# Patient Record
Sex: FEMALE | Race: WHITE | NOT HISPANIC OR LATINO | Employment: FULL TIME | URBAN - NONMETROPOLITAN AREA
[De-identification: names, ages, dates, MRNs, and addresses within clinical notes are randomized per-mention and may not be internally consistent; named-entity substitution may affect disease eponyms.]

---

## 2022-02-12 ENCOUNTER — APPOINTMENT (EMERGENCY)
Dept: CT IMAGING | Facility: HOSPITAL | Age: 42
End: 2022-02-12

## 2022-02-12 ENCOUNTER — APPOINTMENT (EMERGENCY)
Dept: RADIOLOGY | Facility: HOSPITAL | Age: 42
End: 2022-02-12

## 2022-02-12 ENCOUNTER — HOSPITAL ENCOUNTER (EMERGENCY)
Facility: HOSPITAL | Age: 42
Discharge: HOME | End: 2022-02-12
Attending: EMERGENCY MEDICINE | Admitting: EMERGENCY MEDICINE

## 2022-02-12 VITALS
HEART RATE: 66 BPM | WEIGHT: 152.56 LBS | DIASTOLIC BLOOD PRESSURE: 77 MMHG | RESPIRATION RATE: 22 BRPM | OXYGEN SATURATION: 94 % | SYSTOLIC BLOOD PRESSURE: 133 MMHG | TEMPERATURE: 97.4 F

## 2022-02-12 DIAGNOSIS — S09.90XA HEAD INJURY: ICD-10-CM

## 2022-02-12 DIAGNOSIS — S01.91XA LACERATION OF HEAD: Primary | ICD-10-CM

## 2022-02-12 PROCEDURE — 99284 EMERGENCY DEPT VISIT MOD MDM: CPT

## 2022-02-12 PROCEDURE — 99282 EMERGENCY DEPT VISIT SF MDM: CPT | Performed by: EMERGENCY MEDICINE

## 2022-02-12 PROCEDURE — 70450 CT HEAD/BRAIN W/O DYE: CPT

## 2022-02-12 RX ORDER — LIDOCAINE HYDROCHLORIDE 10 MG/ML
10 INJECTION, SOLUTION EPIDURAL; INFILTRATION; INTRACAUDAL; PERINEURAL ONCE
Status: DISCONTINUED | OUTPATIENT
Start: 2022-02-12 | End: 2022-02-12 | Stop reason: HOSPADM
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
## 2022-02-13 NOTE — ED NOTES
Patient refusing to let staff assist to repair laceration to head  Stating multiple times "leave me the fuck alone, everyone needs to back the fuck off, I just want to sleep"  Asked if she wants us to repair the laceration or not and stated no  Provider explained the risks of not allowing us to repair laceration  Patient continues to be uncooperative  Given discharge instructions  Initially refusing to leave stating she just wanted to sleep  Advised that she is discharged and it is now time to go if she is not accepting our services, and if she does not leave security will be called and PD if necessary  Patient whipped open doors to room and stormed out of department, steady gait noted        Jerilyn Han, RN  02/12/22 2020

## 2022-02-13 NOTE — ED NOTES
AAO, talkative and Tearful during assessment, reassurance and moral support provided  Reports/expresses how vehicle is left at the police station, and do not know how she will "get my car" offered to insert IV line, and clean dry blood- pt refuses  No reports of acute pain or discomfort at present        Isis Alvarez RN  02/12/22 9070

## 2022-06-07 ENCOUNTER — HOSPITAL ENCOUNTER (EMERGENCY)
Facility: HOSPITAL | Age: 42
Discharge: HOME | End: 2022-06-07
Attending: EMERGENCY MEDICINE

## 2022-06-07 ENCOUNTER — APPOINTMENT (EMERGENCY)
Dept: RADIOLOGY | Facility: HOSPITAL | Age: 42
End: 2022-06-07

## 2022-06-07 VITALS
DIASTOLIC BLOOD PRESSURE: 91 MMHG | HEART RATE: 100 BPM | WEIGHT: 144.18 LBS | SYSTOLIC BLOOD PRESSURE: 146 MMHG | OXYGEN SATURATION: 98 % | RESPIRATION RATE: 18 BRPM | TEMPERATURE: 97.2 F

## 2022-06-07 DIAGNOSIS — S92.501A: Primary | ICD-10-CM

## 2022-06-07 PROCEDURE — 99283 EMERGENCY DEPT VISIT LOW MDM: CPT

## 2022-06-07 PROCEDURE — 99284 EMERGENCY DEPT VISIT MOD MDM: CPT | Performed by: EMERGENCY MEDICINE

## 2022-06-07 PROCEDURE — 73630 X-RAY EXAM OF FOOT: CPT

## 2022-06-07 RX ORDER — ACETAMINOPHEN 325 MG/1
975 TABLET ORAL ONCE
Status: DISCONTINUED | OUTPATIENT
Start: 2022-06-07 | End: 2022-06-07 | Stop reason: HOSPADM

## 2022-06-07 RX ORDER — KETOROLAC TROMETHAMINE 30 MG/ML
30 INJECTION, SOLUTION INTRAMUSCULAR; INTRAVENOUS ONCE
Status: DISCONTINUED | OUTPATIENT
Start: 2022-06-07 | End: 2022-06-07 | Stop reason: HOSPADM

## 2022-06-07 RX ORDER — OXYCODONE HYDROCHLORIDE 5 MG/1
5 TABLET ORAL EVERY 6 HOURS PRN
Qty: 5 TABLET | Refills: 0 | Status: SHIPPED | OUTPATIENT
Start: 2022-06-07 | End: 2022-06-10

## 2022-06-07 NOTE — Clinical Note
Nehemiahrosalva Lee was seen and treated in our emergency department on 6/7/2022  Diagnosis:     Crystal       She may return on this date: If you have any questions or concerns, please don't hesitate to call        Alex Chandra RN    ______________________________           _______________          _______________  Hospital Representative                              Date                                Time

## 2022-06-07 NOTE — ED PROVIDER NOTES
Final Diagnosis:  1  Closed fracture of fifth toe of right foot        Chief Complaint   Patient presents with   [address] [facility] Injury     Patient reports fall last week twisting right ankle  Since has had pain in right foot  Today hit bed with same foot making pain worse  HPI  Patient presents for evaluation of right-sided foot pain  Patient states that approximately a week ago she got her foot stuck between 2 objects in there was a twisting action  She states that she heard a lot of popping and snapping at that time and had bruising discoloration over the lateral aspect of her right distal foot  She said then the pain generally improved but today she then struck the seen area against of bed and had the acute onset of pain once again  At this time she identifies this as being mostly focus over her 4th and 5th distal metatarsals  Denies any other injury  This worse with palpation or pressure  Better at rest       Unless otherwise specified:  - No language barrier    - History obtained from patient  - There are no limitations to the history obtained  - Previous charting was reviewed    PMH:   has a past medical history of Alcohol abuse, Anxiety, and Hypertension  PSH:   has no past surgical history on file  ROS:  Review of Systems   -   - 13 point ROS was performed and all are normal unless stated in the history above  - Nursing note reviewed  Vitals reviewed  - Orders placed by myself and/or advanced practitioner / resident  PE:   Vitals:    06/07/22 0804 06/07/22 0805 06/07/22 0815   BP:  (!) 152/102 146/91   BP Location:  Right arm Right arm   Pulse:  105 100   Resp:  18 18   Temp: (!) 97 2 °F (36 2 °C)     TempSrc: Temporal     SpO2:  99% 98%   Weight:  65 4 kg (144 lb 2 9 oz)      Vitals reviewed by me  Patient has mild erythema and generalized swelling over 5th digit on right foot    She is tender to palpation over the distal portion of her 4th and 5th metatarsals as well as her 5th phalanges  Good capillary refill  Full range of motion  Sensation intact  No tenderness of the ankle  Achilles intact  Unless otherwise specified above:    General: VS reviewed  Appears in NAD    Head: Normocephalic, atraumatic  Eyes: EOM-I  No exudate  ENT: Atraumatic external nose and ears  No malocclusion  No stridor  No drooling  Neck: No JVD  CV: No pallor noted  Lungs:   No tachypnea  No respiratory distress    Abdomen:  Soft, non-tender, non-distended    MSK:   FROM spontaneously  No obvious deformity    Skin: Dry, intact  No obvious rash  Neuro: Awake, alert, GCS15, CN II-XII grossly intact  Speaking in full sentences  Motor grossly intact  Psychiatric/Behavioral: Appropriate mood and affect   Exam: deferred    Physical Exam     Procedures   A:  - Nursing note reviewed  XR foot 3+ views RIGHT   ED Interpretation   Fracture of 5th proximal phalanges        Orders Placed This Encounter   Procedures    XR foot 3+ views RIGHT     Labs Reviewed - No data to display      Final Diagnosis:  1  Closed fracture of fifth toe of right foot        P:  - patient presents for evaluation of foot trauma  Will provide x-rays  Analgesia  -fracture on x-ray  Discussed buddy taping the foot following up with Podiatry as needed  Return precautions discussed      Medications   ketorolac (TORADOL) injection 30 mg (30 mg Intramuscular Not Given 6/7/22 0828)   acetaminophen (TYLENOL) tablet 975 mg (975 mg Oral Not Given 6/7/22 9150)     Time reflects when diagnosis was documented in both MDM as applicable and the Disposition within this note     Time User Action Codes Description Comment    6/7/2022  8:54 AM [place] Cancel Add [D56 305W] Toe fracture     6/7/2022  8:54 AM Jaguar Blackwell [S92 502A] Closed fracture of fifth toe of left foot     6/7/2022  8:54 AM Dyan Blackwell [S92 502A] Closed fracture of fifth toe of left foot     6/7/2022  8:54 AM Rosalva Marie Davidson Add [S92 501A] Closed fracture of fifth toe of right foot     6/7/2022  8:54 AM Yadira Lamar [O87 761Y] Closed fracture of fifth toe of right foot     6/7/2022  8:54 AM Mitesh Blackwell [N24 242X] Toe fracture       ED Disposition     ED Disposition   Discharge    Condition   Stable    Date/Time   Tue Jun 7, 2022  8:54 AM    Tamika Theodore discharge to home/self care  Follow-up Information     Follow up With Specialties Details Why Contact Info Additional Information    [facility]  If symptoms worsen [address] [facility] [address]  [address] [place] [address], [address] [place] [id number], [phone]        Patient's Medications   Discharge Prescriptions    OXYCODONE (ROXICODONE) 5 IMMEDIATE RELEASE TABLET    Take 1 tablet (5 mg total) by mouth every 6 (six) hours as needed for moderate pain or severe pain for up to 3 days Max Daily Amount: 20 mg       Start Date: 6/7/2022  End Date: 6/10/2022       Order Dose: 5 mg       Quantity: 5 tablet    Refills: 0     No discharge procedures on file  None       Portions of the record may have been created with voice recognition software  Occasional wrong word or "sound a like" substitutions may have occurred due to the inherent limitations of voice recognition software  Read the chart carefully and recognize, using context, where substitutions have occurred      Electronically signed by:  MD Mariia Gomez MD  06/07/22 5114

## 2022-06-07 NOTE — Clinical Note
Chapo Reynolds was seen and treated in our emergency department on 6/7/2022  Diagnosis:     Mariia  may return to work on return date  She may return on this date: 06/09/2022         If you have any questions or concerns, please don't hesitate to call        Carisa Thompson MD    ______________________________           _______________          _______________  Hospital Representative                              Date                                Time

## 2024-06-19 ENCOUNTER — HOSPITAL ENCOUNTER (EMERGENCY)
Facility: HOSPITAL | Age: 44
Discharge: HOME | End: 2024-06-19
Attending: EMERGENCY MEDICINE | Admitting: EMERGENCY MEDICINE

## 2024-06-19 VITALS
RESPIRATION RATE: 18 BRPM | TEMPERATURE: 98.4 F | SYSTOLIC BLOOD PRESSURE: 131 MMHG | HEART RATE: 97 BPM | OXYGEN SATURATION: 99 % | DIASTOLIC BLOOD PRESSURE: 88 MMHG

## 2024-06-19 DIAGNOSIS — L03.113 CELLULITIS OF RIGHT HAND: Primary | ICD-10-CM

## 2024-06-19 PROBLEM — S02.2XXA CLOSED FRACTURE OF NASAL BONE: Status: ACTIVE | Noted: 2017-03-13

## 2024-06-19 PROBLEM — S22.32XA CLOSED FRACTURE OF ONE RIB OF LEFT SIDE: Status: ACTIVE | Noted: 2017-03-13

## 2024-06-19 LAB
EXT PREGNANCY TEST URINE: NEGATIVE
EXT. CONTROL: NORMAL

## 2024-06-19 PROCEDURE — 81025 URINE PREGNANCY TEST: CPT | Performed by: PHYSICIAN ASSISTANT

## 2024-06-19 PROCEDURE — 99283 EMERGENCY DEPT VISIT LOW MDM: CPT

## 2024-06-19 PROCEDURE — 99284 EMERGENCY DEPT VISIT MOD MDM: CPT | Performed by: PHYSICIAN ASSISTANT

## 2024-06-19 RX ORDER — DOXYCYCLINE HYCLATE 100 MG/1
100 CAPSULE ORAL 2 TIMES DAILY
Qty: 14 CAPSULE | Refills: 0 | Status: SHIPPED | OUTPATIENT
Start: 2024-06-19 | End: 2024-06-26

## 2024-06-19 RX ORDER — IBUPROFEN 600 MG/1
600 TABLET ORAL EVERY 6 HOURS PRN
Qty: 30 TABLET | Refills: 0 | Status: SHIPPED | OUTPATIENT
Start: 2024-06-19

## 2024-06-19 RX ORDER — IBUPROFEN 600 MG/1
600 TABLET ORAL ONCE
Status: DISCONTINUED | OUTPATIENT
Start: 2024-06-19 | End: 2024-06-19 | Stop reason: HOSPADM

## 2024-06-19 RX ORDER — DOXYCYCLINE HYCLATE 100 MG/1
100 CAPSULE ORAL ONCE
Status: COMPLETED | OUTPATIENT
Start: 2024-06-19 | End: 2024-06-19

## 2024-06-19 RX ADMIN — DOXYCYCLINE HYCLATE 100 MG: 100 CAPSULE ORAL at 19:05

## 2024-06-19 NOTE — DISCHARGE INSTRUCTIONS
Rest, ice, compression, elevation.  Take antibiotic as directed for the full duration.  Continue to alternate OTC tylenol and ibuprofen as needed for discomfort.  Continue benadryl as needed for itching.  Follow up with PCP in 2-3 days if not improving or return as needed.

## 2024-06-19 NOTE — ED PROVIDER NOTES
"History  Chief Complaint   Patient presents with    Hand Swelling     Patient states she ws bit by a moth yesterday after and now her right hand is swelling with pain     43 year old right hand dominant female with PMH HTN, anxiety presenting for evaluation of right hand swelling.  Pt reports she was bit by a moth yesterday.  She states it landed on the back of her hand and she believes it bit her.  She notes the area was itchy but now today is swollen and feels warm.  No reported redness.  Denies fever, chills.  Denies cough, congestion or recent illness.  Denies chest pain, SOB.  Denies N/V/D, abdominal pain.  She reports she has a few scattered areas of \"poison ivy\" [place]  She notes an area on her right upper arm and lower legs.  She has been taking OTC benadryl for itching.  Denies recent travel.  Denies sick contacts.  No reported aggravating or alleviating factors.  No prior evaluation since onset.  Pt expresses concern about possible cellulitis.  She states she's had cellulitis in the past (>10 years ago) from spider bites.  No known h/o MRSA that she is aware.  She reports her tetanus is UTD within the past 4 years.  No prior evaluation since onset.      History provided by:  Patient and medical records   used: No        None       Past Medical History:   Diagnosis Date    Alcohol abuse     Anxiety     Hypertension        History reviewed. No pertinent surgical history.    History reviewed. No pertinent family history.  I have reviewed and agree with the history as documented.    E-Cigarette/Vaping    E-Cigarette Use Never User      E-Cigarette/Vaping Substances    THC Yes      Social History     Tobacco Use    Smoking status: Every Day     Current packs/day: 0.50     Types: Cigarettes    Smokeless tobacco: Never   Vaping Use    Vaping status: Never Used   Substance Use Topics    Alcohol use: Yes     Comment: scocial    Drug use: Yes     Types: " Marijuana       Review of Systems   Constitutional: Negative.  Negative for chills, fatigue and fever.   HENT: Negative.  Negative for congestion, rhinorrhea and sore throat.    Eyes: Negative.  Negative for visual disturbance.   Respiratory: Negative.  Negative for cough, shortness of breath and wheezing.    Cardiovascular: Negative.  Negative for chest pain, palpitations and leg swelling.   Gastrointestinal: Negative.  Negative for abdominal pain, diarrhea, nausea and vomiting.   Genitourinary: Negative.  Negative for dysuria and frequency.   Musculoskeletal:  Positive for joint swelling. Negative for back pain and neck pain.   Skin: Negative.  Negative for rash.   Neurological: Negative.  Negative for dizziness, light-headedness, numbness and headaches.   Psychiatric/Behavioral: Negative.     All other systems reviewed and are negative.      Physical Exam  Physical Exam  Vitals and nursing note reviewed.   Constitutional:       General: She is awake. She is not in acute distress.     Appearance: She is well-developed. She is not toxic-appearing or diaphoretic.   HENT:      Head: Normocephalic and atraumatic.      Right Ear: Hearing and external ear normal.      Left Ear: Hearing and external ear normal.      Mouth/Throat:      Mouth: Oropharynx is clear and moist and mucous membranes are normal. Mucous membranes are moist.      Pharynx: Oropharynx is clear.   Eyes:      General: Lids are normal. No scleral icterus.     Extraocular Movements: EOM normal.      Conjunctiva/sclera: Conjunctivae normal.      Pupils: Pupils are equal, round, and reactive to light.   Neck:      Trachea: Trachea and phonation normal. No tracheal deviation.   Cardiovascular:      Rate and Rhythm: Normal rate and regular rhythm.      Pulses: Normal pulses.           Radial pulses are 2+ on the right side and 2+ on the left side.      Heart sounds: Normal heart sounds, S1 normal and S2 normal. No murmur heard.  Pulmonary:      Effort:  Pulmonary effort is normal. No tachypnea or respiratory distress.      Breath sounds: Normal breath sounds.   Abdominal:      Tenderness: There is no CVA tenderness.   Musculoskeletal:         General: No edema.      Right wrist: No swelling, deformity or bony tenderness. Normal range of motion. Normal pulse.      Right hand: Swelling and tenderness present. Normal range of motion. Normal sensation. There is no disruption of two-point discrimination. Normal capillary refill. Normal pulse.        Hands:       Right lower leg: No edema.      Left lower leg: No edema.      Comments: Pt reports getting bit in this region.  No open wounds but there appears to be a small scab near the M shaped tattoo near the lateral aspect of the hand.  No discharge or drainage.  No abscess.  No rashes.  Pt has healed tattoos on dorsum of hand.  There is swelling and warm overlying the dorsum of the hand.    No bony tenderness.  No crepitus.  Normal sensation, brisk capillary refill.  Normal ROM.   Skin:     General: Skin is warm and dry.      Capillary Refill: Capillary refill takes less than 2 seconds.      Findings: No abrasion, bruising or laceration.   Neurological:      General: No focal deficit present.      Mental Status: She is alert and oriented to person, place, and time.      GCS: GCS eye subscore is 4. GCS verbal subscore is 5. GCS motor subscore is 6.      Cranial Nerves: No cranial nerve deficit.      Sensory: Sensation is intact. No sensory deficit.      Motor: Motor function is intact. No weakness or abnormal muscle tone.      Gait: Gait normal.   Psychiatric:         Mood and Affect: Mood and affect and mood normal.         Speech: Speech normal.         Behavior: Behavior normal. Behavior is cooperative.         Vital Signs  ED Triage Vitals [06/19/24 1821]   Temperature Pulse Respirations Blood Pressure SpO2   98.4 °F (36.9 °C) 97 18 131/88 99 %      Temp Source Heart Rate Source Patient Position - Orthostatic VS BP  Location FiO2 (%)   Temporal Monitor Sitting Left arm --      Pain Score       5           Vitals:    06/19/24 1821   BP: 131/88   Pulse: 97   Patient Position - Orthostatic VS: Sitting         Visual Acuity      ED Medications  Medications   ibuprofen (MOTRIN) tablet 600 mg (600 mg Oral Not Given 6/19/24 1905)   doxycycline hyclate (VIBRAMYCIN) capsule 100 mg (100 mg Oral Given 6/19/24 1905)       Diagnostic Studies  Results Reviewed       Procedure Component Value Units Date/Time    POCT pregnancy, urine [[id number]]  (Normal) Resulted: 06/19/24 1902    Lab Status: Final result Updated: 06/19/24 1902     EXT Preg Test, Ur Negative     Control Valid                   No orders to display              Procedures  Procedures         ED Course  ED Course as of 06/19/24 1938 Wed Jun 19, 2024 1841 Pt denies chance of pregnancy.   1903 PREGNANCY TEST URINE: Negative                               SBIRT 22yo+      Flowsheet Row Most Recent Value   Initial Alcohol Screen: US AUDIT-C     1. How often do you have a drink containing alcohol? 0 Filed at: 06/19/2024 1823   2. How many drinks containing alcohol do you have on a typical day you are drinking?  0 Filed at: 06/19/2024 1823   3a. Male UNDER 65: How often do you have five or more drinks on one occasion? 0 Filed at: 06/19/2024 1823   3b. FEMALE Any Age, or MALE 65+: How often do you have 4 or more drinks on one occassion? 0 Filed at: 06/19/2024 1823   Audit-C Score 0 Filed at: 06/19/2024 1823   JANNET: How many times in the past year have you...    Used an illegal drug or used a prescription medication for non-medical reasons? Never Filed at: 06/19/2024 1823                      Medical Decision Making  42 yo female presenting for evaluation of right hand swelling.  She reports she was bit by a moth.  There is concern for possible mild cellulitis.  She is afebrile, otherwise well appearing.  She does not appear systemically ill.  I do not suspect sepsis.  There is no  noted abscess.  I do not suspect any acute osseous findings.  No h/o MRSA reported.  Will start on PO antibiotic and discharge with symptomatic management.  Reviewed RICE therapy.  Recommended NSAID and OTC tylenol for pain relief.    Pt was instructed to stay out of the sun while on antibiotic and utilize sunscreen.  Reviewed symptomatic management.  OTC meds reviewed.  Anticipatory guidance.  Advised recheck with PCP in 2-3 days if not improving or return to ER as needed.  Strict return precautions outlined.  Patient voiced understanding and had no further questions.    Please refer to above ER course for further details/discussion.      Problems Addressed:  Cellulitis of right hand: acute illness or injury    Amount and/or Complexity of Data Reviewed  External Data Reviewed: notes.  Labs: ordered. Decision-making details documented in ED Course.    Risk  OTC drugs.  Prescription drug management.             Disposition  Final diagnoses:   Cellulitis of right hand     Time reflects when diagnosis was documented in both MDM as applicable and the Disposition within this note       Time User Action Codes Description Comment    6/19/2024  6:43 PM Elizabeth Boucher Add [L03.113] Cellulitis of right hand           ED Disposition       ED Disposition   Discharge    Condition   Stable    Date/Time   Wed Jun 19, 2024 1843    Comment   Mariia Theodore discharge to home/self care.                   Follow-up Information       Follow up With Specialties Details Why Contact Info Additional Information    [facility] Emergency Department Emergency Medicine  As needed [address] [facility] [id number] [phone] [facility] Emergency Department, [address], [place], [address]            Discharge Medication List as of 6/19/2024  6:54 PM        START taking these medications    Details   doxycycline hyclate (VIBRAMYCIN) 100 mg capsule Take 1 capsule (100 mg total)  by mouth 2 (two) times a day for 7 days, Starting Wed 6/19/2024, Until Wed 6/26/2024, Normal      ibuprofen (MOTRIN) 600 mg tablet Take 1 tablet (600 mg total) by mouth every 6 (six) hours as needed for mild pain or moderate pain, Starting Wed 6/19/2024, Normal             No discharge procedures on file.    PDMP Review       None            ED Provider  Electronically Signed by             Elizabeth Boucher PA-C  06/19/24 [id number]

## 2024-09-23 PROCEDURE — 99284 EMERGENCY DEPT VISIT MOD MDM: CPT

## 2024-09-23 PROCEDURE — 76815 OB US LIMITED FETUS(S): CPT

## 2024-09-24 ENCOUNTER — HOSPITAL ENCOUNTER (EMERGENCY)
Facility: HOSPITAL | Age: 44
Discharge: HOME | End: 2024-09-24
Payer: COMMERCIAL

## 2024-09-24 VITALS
BODY MASS INDEX: 25.38 KG/M2 | HEART RATE: 75 BPM | WEIGHT: 152.34 LBS | HEIGHT: 65 IN | SYSTOLIC BLOOD PRESSURE: 124 MMHG | TEMPERATURE: 97.5 F | OXYGEN SATURATION: 99 % | RESPIRATION RATE: 16 BRPM | DIASTOLIC BLOOD PRESSURE: 79 MMHG

## 2024-09-24 DIAGNOSIS — O20.9 VAGINAL BLEEDING AFFECTING EARLY PREGNANCY: Primary | ICD-10-CM

## 2024-09-24 DIAGNOSIS — O09.30 PRENATAL CARE INSUFFICIENT: ICD-10-CM

## 2024-09-24 LAB
ABO GROUP BLD: NORMAL
ANION GAP SERPL CALCULATED.3IONS-SCNC: 9 MMOL/L (ref 4–13)
B-HCG SERPL-ACNC: ABNORMAL MIU/ML (ref 0–5)
BASOPHILS # BLD AUTO: 0.06 THOUSANDS/ΜL (ref 0–0.1)
BASOPHILS NFR BLD AUTO: 1 % (ref 0–1)
BLD GP AB SCN SERPL QL: NEGATIVE
BUN SERPL-MCNC: 11 MG/DL (ref 5–25)
CALCIUM SERPL-MCNC: 9.2 MG/DL (ref 8.4–10.2)
CHLORIDE SERPL-SCNC: 103 MMOL/L (ref 96–108)
CO2 SERPL-SCNC: 23 MMOL/L (ref 21–32)
CREAT SERPL-MCNC: 0.67 MG/DL (ref 0.6–1.3)
EOSINOPHIL # BLD AUTO: 0.29 THOUSAND/ΜL (ref 0–0.61)
EOSINOPHIL NFR BLD AUTO: 3 % (ref 0–6)
ERYTHROCYTE [DISTWIDTH] IN BLOOD BY AUTOMATED COUNT: 12.8 % (ref 11.6–15.1)
GFR SERPL CREATININE-BSD FRML MDRD: 108 ML/MIN/1.73SQ M
GLUCOSE SERPL-MCNC: 79 MG/DL (ref 65–140)
HCT VFR BLD AUTO: 40.2 % (ref 34.8–46.1)
HGB BLD-MCNC: 13.1 G/DL (ref 11.5–15.4)
IMM GRANULOCYTES # BLD AUTO: 0.02 THOUSAND/UL (ref 0–0.2)
IMM GRANULOCYTES NFR BLD AUTO: 0 % (ref 0–2)
LYMPHOCYTES # BLD AUTO: 1.58 THOUSANDS/ΜL (ref 0.6–4.47)
LYMPHOCYTES NFR BLD AUTO: 18 % (ref 14–44)
MCH RBC QN AUTO: 30.3 PG (ref 26.8–34.3)
MCHC RBC AUTO-ENTMCNC: 32.6 G/DL (ref 31.4–37.4)
MCV RBC AUTO: 93 FL (ref 82–98)
MONOCYTES # BLD AUTO: 0.53 THOUSAND/ΜL (ref 0.17–1.22)
MONOCYTES NFR BLD AUTO: 6 % (ref 4–12)
NEUTROPHILS # BLD AUTO: 6.56 THOUSANDS/ΜL (ref 1.85–7.62)
NEUTS SEG NFR BLD AUTO: 72 % (ref 43–75)
NRBC BLD AUTO-RTO: 0 /100 WBCS
PLATELET # BLD AUTO: 262 THOUSANDS/UL (ref 149–390)
PMV BLD AUTO: 9.4 FL (ref 8.9–12.7)
POTASSIUM SERPL-SCNC: 3.9 MMOL/L (ref 3.5–5.3)
RBC # BLD AUTO: 4.33 MILLION/UL (ref 3.81–5.12)
RH BLD: NEGATIVE
SODIUM SERPL-SCNC: 135 MMOL/L (ref 135–147)
SPECIMEN EXPIRATION DATE: NORMAL
WBC # BLD AUTO: 9.04 THOUSAND/UL (ref 4.31–10.16)

## 2024-09-24 PROCEDURE — 96372 THER/PROPH/DIAG INJ SC/IM: CPT

## 2024-09-24 PROCEDURE — 86850 RBC ANTIBODY SCREEN: CPT

## 2024-09-24 PROCEDURE — 86901 BLOOD TYPING SEROLOGIC RH(D): CPT

## 2024-09-24 PROCEDURE — 85025 COMPLETE CBC W/AUTO DIFF WBC: CPT

## 2024-09-24 PROCEDURE — 84702 CHORIONIC GONADOTROPIN TEST: CPT

## 2024-09-24 PROCEDURE — 80048 BASIC METABOLIC PNL TOTAL CA: CPT

## 2024-09-24 PROCEDURE — 36415 COLL VENOUS BLD VENIPUNCTURE: CPT

## 2024-09-24 PROCEDURE — 86900 BLOOD TYPING SEROLOGIC ABO: CPT

## 2024-09-24 RX ADMIN — RHO(D) IMMUNE GLOBULIN (HUMAN) 300 MCG: 1500 SOLUTION INTRAMUSCULAR at 01:07

## 2024-09-24 NOTE — DISCHARGE INSTRUCTIONS
You had vaginal bleeding during pregnancy.  You have negative Rh factor blood.  Therefore we gave you RhoGAM.  You are higher risk given your age and the vaginal bleeding for possible  complications.  You need to see OB/GYN as soon as possible to get started on your prenatal care for prevention of these complications.  Please call tomorrow morning to schedule earliest available appointment, I also referred you for quicker appointment.

## 2024-09-25 NOTE — ED PROVIDER NOTES
1. Vaginal bleeding affecting early pregnancy    2. Prenatal care insufficient      ED Disposition       ED Disposition   Discharge    Condition   Stable    Date/Time   Tue Sep 24, 2024  1:34 AM    Comment   Mariia Steidinger discharge to home/self care.                   Assessment & Plan       Medical Decision Making  Medical complexity: 43-year-old female presenting with concern for vaginal bleeding/spotting in either late first or early second trimester of pregnancy.  This is in the setting of insufficient prenatal care. She is known to be RH negative.  Therefore will attempt to visualize IUP on transabdominal ultrasound, and will dose RhoGAM will obtain basic labs including beta hCG quantitative count.    Reassessment/disposition: Thankfully, IUP visualized, sufficient fluid just based on gross observation was noted around the fetus who had good fetal movement, and normal range heartbeat.  I did not visualize a large subchorionic hemorrhage on my limited evaluation but patient understands that this is still a very real possibility and that a limited bedside ultrasound is not the best way to evaluate her pregnancy.  Patient was counseled on the importance of following up with OB/GYN as soon as possible to start the process of her prenatal care.  Patient is understanding and asked for phone numbers which I provided as well as a referral to OB/GYN.  Patient understands that she is at higher risk for miscarriage now that she has had bleeding, she will watch out for signs and symptoms of worsening symptoms and come back to the emergency department if she has profuse vaginal bleeding especially if she is soaking through 2 pads per hour for more than 2 hours consecutively.    Amount and/or Complexity of Data Reviewed  Labs: ordered.    Risk  Prescription drug management.                     Medications   Rho(D) immune globulin (RHOGAM ULTRA-FILTERED PLUS) IM injection 300 mcg (300 mcg Intramuscular Given 9/24/24  "0107)       History of Present Illness       This is a 43-year-old female who is presenting today with positive home pregnancy test and vaginal bleeding concern.  Patient reports that for the last day she has had at least 2 episodes of pink-tinged discharge which she states were \"definitely blood\".  She states that one of the times when she changed position and she felt like she may have had a small gush of fluid.  She is concerned that her water may have broken.  She notes that she can feel the fetus moving at times, and continues to feel that even after the events of the last day.  She believes that she is Rh-.  She denies receiving any prenatal care thus far in this pregnancy.  She notes a last menstrual period which would place her at approximately 13 to 14 weeks estimated gestation age.  She has not had a confirmatory ultrasound correctly identifying an IUP as of yet.  She is not having ongoing vaginal bleeding, and states that her symptoms stopped this evening.  She denies any vaginal pain, and she and her significant other have engaged in sexual intercourse recently.  She is denying any significant abdominal pain.  She is denying any fevers or chills or night sweats.        Review of Systems   Constitutional:  Negative for chills and fever.   HENT:  Negative for ear pain and sore throat.    Eyes:  Negative for pain and visual disturbance.   Respiratory:  Negative for cough and shortness of breath.    Cardiovascular:  Negative for chest pain and palpitations.   Gastrointestinal:  Negative for abdominal pain and vomiting.   Genitourinary:  Positive for vaginal bleeding. Negative for dysuria and hematuria.   Musculoskeletal:  Negative for arthralgias and back pain.   Skin:  Negative for color change and rash.   Neurological:  Negative for seizures and syncope.   All other systems reviewed and are negative.          Objective     ED Triage Vitals [09/24/24 0026]   Temperature Pulse Blood Pressure Respirations " SpO2 Patient Position - Orthostatic VS   97.5 °F (36.4 °C) 75 124/79 16 99 % Lying      Temp Source Heart Rate Source BP Location FiO2 (%) Pain Score    Temporal Monitor Left arm -- No Pain        Physical Exam  Vitals and nursing note reviewed.   Constitutional:       General: She is not in acute distress.     Appearance: She is well-developed.   HENT:      Head: Normocephalic and atraumatic.      Right Ear: External ear normal.      Left Ear: External ear normal.      Nose: Nose normal. No congestion or rhinorrhea.      Mouth/Throat:      Mouth: Mucous membranes are moist.      Pharynx: Oropharynx is clear. No oropharyngeal exudate or posterior oropharyngeal erythema.   Eyes:      General: No scleral icterus.     Extraocular Movements: Extraocular movements intact.      Conjunctiva/sclera: Conjunctivae normal.      Pupils: Pupils are equal, round, and reactive to light.   Cardiovascular:      Rate and Rhythm: Normal rate and regular rhythm.      Pulses: Normal pulses.      Heart sounds: Normal heart sounds. No murmur heard.  Pulmonary:      Effort: Pulmonary effort is normal. No respiratory distress.      Breath sounds: Normal breath sounds. No wheezing or rhonchi.   Abdominal:      General: Abdomen is flat. There is no distension.      Palpations: Abdomen is soft.      Tenderness: There is no abdominal tenderness. There is no guarding.      Comments: Palpable uterine fundus approximately 8cm  below the umbilicus   Musculoskeletal:         General: No swelling.      Cervical back: Neck supple. No rigidity.      Right lower leg: No edema.      Left lower leg: No edema.   Lymphadenopathy:      Cervical: No cervical adenopathy.   Skin:     General: Skin is warm and dry.      Capillary Refill: Capillary refill takes less than 2 seconds.      Coloration: Skin is not jaundiced.      Findings: No rash.   Neurological:      General: No focal deficit present.      Mental Status: She is alert and oriented to person, place,  and time. Mental status is at baseline.   Psychiatric:         Mood and Affect: Mood normal.         Behavior: Behavior normal.         Labs Reviewed   HCG, QUANTITATIVE - Abnormal       Result Value    HCG, Quant 28,116.1 (*)     Narrative:      Expected Ranges:    HCG results between 5.0 and 25.0 mIU/mL may be indicative of early pregnancy but should be interpreted in light of the total clinical presentation.    HCG can rise to detectable levels in henrique and post menopausal women (0-11.6 mIU/mL).     Approximate               Approximate HCG  Gestation age          Concentration ( mIU/mL)  _____________          ______________________   Weeks                      HCG values  0.2-1                       5-50  1-2                           2-3                         100-5000  3-4                         500-85753  4-5                         1000-00328  5-6                         [id number]  6-8                         [id number]  8-12                        [id number]     CBC AND DIFFERENTIAL    WBC 9.04      RBC 4.33      Hemoglobin 13.1      Hematocrit 40.2      MCV 93      MCH 30.3      MCHC 32.6      RDW 12.8      MPV 9.4      Platelets 262      nRBC 0      Segmented % 72      Immature Grans % 0      Lymphocytes % 18      Monocytes % 6      Eosinophils Relative 3      Basophils Relative 1      Absolute Neutrophils 6.56      Absolute Immature Grans 0.02      Absolute Lymphocytes 1.58      Absolute Monocytes 0.53      Eosinophils Absolute 0.29      Basophils Absolute 0.06     BASIC METABOLIC PANEL    Sodium 135      Potassium 3.9      Chloride 103      CO2 23      ANION GAP 9      BUN 11      Creatinine 0.67      Glucose 79      Calcium 9.2      eGFR 108      Narrative:     National Kidney Disease Foundation guidelines for Chronic Kidney Disease (CKD):     Stage 1 with normal or high GFR (GFR > 90 mL/min/1.73 square meters)    Stage 2 Mild CKD (GFR = 60-89 mL/min/1.73 square meters)    Stage 3A  Moderate CKD (GFR = 45-59 mL/min/1.73 square meters)    Stage 3B Moderate CKD (GFR = 30-44 mL/min/1.73 square meters)    Stage 4 Severe CKD (GFR = 15-29 mL/min/1.73 square meters)    Stage 5 End Stage CKD (GFR <15 mL/min/1.73 square meters)  Note: GFR calculation is accurate only with a steady state creatinine   TYPE AND SCREEN    ABO Grouping O      Rh Factor Negative      Antibody Screen Negative      Specimen Expiration Date [id number]       No orders to display       POC Pelvic US    Date/Time: 9/24/2024 12:26 AM    Performed by: Jonathan Aggarwal MD  Authorized by: Jonathan Aggarwal MD    Patient location:  ED  Procedure details:     Exam Type:  Diagnostic    Indications: evaluate for IUP      Assessment for: confirm intrauterine pregnancy and evaluate fetal viability      Technique:  Transabdominal obstetric (HCG+) exam    Views obtained: uterus (transverse and sagittal) and pouch of Claude      Image quality: diagnostic      Image availability:  Images available in PACS  Uterine findings:     Intrauterine pregnancy: identified      Single gestation: identified      Fetal heart rate: identified      Fetal heart rate (bpm):  150  Other findings:     Free pelvic fluid: not identified    Interpretation:     Ultrasound impressions: normal      Pregnancy findings: intrauterine pregnancy (IUP)        ED Medication and Procedure Management   Prior to Admission Medications   Prescriptions Last Dose Informant Patient Reported? Taking?   ibuprofen (MOTRIN) 600 mg tablet Not Taking  No No   Sig: Take 1 tablet (600 mg total) by mouth every 6 (six) hours as needed for mild pain or moderate pain   Patient not taking: Reported on 9/24/2024      Facility-Administered Medications: None     Discharge Medication List as of 9/24/2024  1:38 AM        CONTINUE these medications which have NOT CHANGED    Details   ibuprofen (MOTRIN) 600 mg tablet Take 1 tablet (600 mg total) by mouth every 6 (six) hours as needed for mild pain or  moderate pain, Starting Wed 6/19/2024, Normal                Jonathan Aggarwal MD  09/25/24 0029

## 2024-10-24 ENCOUNTER — TELEPHONE (OUTPATIENT)
Dept: OBGYN CLINIC | Facility: CLINIC | Age: 44
End: 2024-10-24

## 2024-10-24 ENCOUNTER — ULTRASOUND (OUTPATIENT)
Dept: OBGYN CLINIC | Facility: CLINIC | Age: 44
End: 2024-10-24
Payer: COMMERCIAL

## 2024-10-24 VITALS
WEIGHT: 156.7 LBS | BODY MASS INDEX: 26.11 KG/M2 | SYSTOLIC BLOOD PRESSURE: 130 MMHG | HEIGHT: 65 IN | DIASTOLIC BLOOD PRESSURE: 80 MMHG

## 2024-10-24 DIAGNOSIS — Z32.01 POSITIVE PREGNANCY TEST: Primary | ICD-10-CM

## 2024-10-24 DIAGNOSIS — O09.30 PRENATAL CARE INSUFFICIENT: ICD-10-CM

## 2024-10-24 DIAGNOSIS — N91.2 AMENORRHEA: Primary | ICD-10-CM

## 2024-10-24 PROCEDURE — 99203 OFFICE O/P NEW LOW 30 MIN: CPT | Performed by: OBSTETRICS & GYNECOLOGY

## 2024-10-24 PROCEDURE — 76817 TRANSVAGINAL US OBSTETRIC: CPT | Performed by: OBSTETRICS & GYNECOLOGY

## 2024-10-24 NOTE — PROGRESS NOTES
"Pregnancy Confirmation Visit  [facility] [address] Marce Soto PA    Assessment/Plan:  43 y.o.  presenting with missed menses.  Viable pregnancy 18w4d by LMP consistent with ultrasound today.  - Continue/start prenatal vitamin  - refer to [facility] for confirmation of dating and NIPT    Subjective:    CC: Missed period    Mariia Theodore is a 43 y.o.  who presents with missed menses.  LMP Patient's last menstrual period was 2024 (exact date)..      Patient notes that this pregnancy was unplanned yet desired.  She was not using contraception at the time of conception. She reports she is certain of her LMP and that she has regular menses. She has has no vaginal bleeding since her LMP.    Objective:  /80   Ht 5' 5\" (1.651 m)   Wt 71.1 kg (156 lb 11.2 oz)   LMP 2024 (Exact Date)   BMI 26.08 kg/m²     Physical Exam:  General: Well appearing, no distress  CV: Regular rate  Respiratory: Unlabored breathing  Abdomen: Soft, nontender  Extremities: Without edema  Mood and Affect: Appropriate    Transvaginal Pelvic Ultrasound  Johnson IUP  Yolk sac: Present  Fetal Pole: Present  CRL consistent with EGA 17w3d  Cardiac activity: Present   bpm  No adnexal masses appreciated  "

## 2024-10-25 ENCOUNTER — TELEPHONE (OUTPATIENT)
Age: 44
End: 2024-10-25

## 2024-11-04 ENCOUNTER — TELEPHONE (OUTPATIENT)
Age: 44
End: 2024-11-04

## 2024-11-11 ENCOUNTER — ROUTINE PRENATAL (OUTPATIENT)
Facility: HOSPITAL | Age: 44
End: 2024-11-11
Attending: OBSTETRICS & GYNECOLOGY
Payer: COMMERCIAL

## 2024-11-11 VITALS
HEIGHT: 65 IN | SYSTOLIC BLOOD PRESSURE: 130 MMHG | BODY MASS INDEX: 27.44 KG/M2 | DIASTOLIC BLOOD PRESSURE: 78 MMHG | HEART RATE: 102 BPM | WEIGHT: 164.68 LBS

## 2024-11-11 DIAGNOSIS — N91.2 AMENORRHEA: ICD-10-CM

## 2024-11-11 DIAGNOSIS — Z36.86 ENCOUNTER FOR ANTENATAL SCREENING FOR CERVICAL LENGTH: ICD-10-CM

## 2024-11-11 DIAGNOSIS — Z3A.21 21 WEEKS GESTATION OF PREGNANCY: ICD-10-CM

## 2024-11-11 DIAGNOSIS — F17.200 TOBACCO USE DISORDER: ICD-10-CM

## 2024-11-11 DIAGNOSIS — Z3A.19 19 WEEKS GESTATION OF PREGNANCY: ICD-10-CM

## 2024-11-11 DIAGNOSIS — O09.512 PRIMIGRAVIDA OF ADVANCED MATERNAL AGE IN SECOND TRIMESTER: ICD-10-CM

## 2024-11-11 DIAGNOSIS — O09.522 SUPERVISION OF ELDERLY MULTIGRAVIDA IN SECOND TRIMESTER: Primary | ICD-10-CM

## 2024-11-11 DIAGNOSIS — O43.199 MARGINAL INSERTION OF UMBILICAL CORD AFFECTING MANAGEMENT OF MOTHER: ICD-10-CM

## 2024-11-11 DIAGNOSIS — Z36.3 ENCOUNTER FOR ANTENATAL SCREENING FOR MALFORMATION: ICD-10-CM

## 2024-11-11 PROCEDURE — 99243 OFF/OP CNSLTJ NEW/EST LOW 30: CPT | Performed by: OBSTETRICS & GYNECOLOGY

## 2024-11-11 PROCEDURE — 76811 OB US DETAILED SNGL FETUS: CPT | Performed by: OBSTETRICS & GYNECOLOGY

## 2024-11-11 RX ORDER — ASPIRIN 81 MG/1
162 TABLET ORAL DAILY
Qty: 180 TABLET | Refills: 2 | Status: CANCELLED | OUTPATIENT
Start: 2024-11-11

## 2024-11-11 NOTE — LETTER
"2024    Mariposa Ying MD  [address] [place] [address]  [address] [place] [address]    Patient: Mariia Theodore   YOB: 1980   Date of Visit: 2024   Gestational age 19w3d   Nature of this communication: Routine though please note marginal PCI, AMA, dated by today's US 19w3d = 25, Epic updated. Patient inquired about paternity testing and resources provided       Dear Dr Ying,    This patient was seen recently in our  office.  The content of my evaluation today is in the ultrasound report under \"OB Procedures\" tab.     Please don't hesitate to contact our office with any concerns or questions.     Sincerely,      Iesha Morales MD  Attending Physician, Maternal-Fetal Medicine  [facility]      "

## 2024-11-11 NOTE — PROGRESS NOTES
Patient chose to have [facility] [id number] Non-Invasive Prenatal Screen [id number] PLUS w/ SCA, WITH fetal sex.  Patient choose to be billed through insurance.     Patient given brochure and is aware [facility] will contact patient's insurance and coordinate coverage.  Provided [facility] contact information. General inquiries [phone], Cost estimates [phone] and [facility] Billing [phone]. Website [email or web address].     Blood collection tubes labeled with patient identifiers (name, medical record number, and date of birth).     Filled out [facility] order form. Patient chose to be sent to an outpatient lab to complete blood work. Attempted 1x in left AC and 1x in right AC by Guadalupe BELL MA.       If patient chose to have blood work drawn at a [facility] lab we requested patient notify MFM (via phone call or [employer] message) when blood collected so office can follow up on results.       Maternal Fetal Medicine will have results in approximately 5-7 business days and will call patient or notify via [employer].  Patient aware viewing lab result online will reveal fetal sex if ordered.    Patient verbalized understanding of all instructions and no questions at this time.

## 2024-11-11 NOTE — PROGRESS NOTES
"[facility]: Mariia Theodore was seen today for anatomic survey ultrasound.  See ultrasound report under \"OB Procedures\" tab.   Please don't hesitate to contact our office with any concerns or questions.  -Iesha Morales MD      "

## 2024-11-11 NOTE — PROGRESS NOTES
Ultrasound Probe Disinfection    A transvaginal ultrasound was performed.   Prior to use, disinfection was performed with High Level Disinfection Process (Graspron).  Probe serial number [id number]: [id number] was used.    Germaine Cardoza  11/11/24  8:28 AM

## 2024-11-14 ENCOUNTER — INITIAL PRENATAL (OUTPATIENT)
Dept: OBGYN CLINIC | Facility: CLINIC | Age: 44
End: 2024-11-14
Payer: COMMERCIAL

## 2024-11-14 VITALS
WEIGHT: 166 LBS | SYSTOLIC BLOOD PRESSURE: 120 MMHG | DIASTOLIC BLOOD PRESSURE: 70 MMHG | BODY MASS INDEX: 27.66 KG/M2 | HEIGHT: 65 IN

## 2024-11-14 DIAGNOSIS — O09.522 MULTIGRAVIDA OF ADVANCED MATERNAL AGE IN SECOND TRIMESTER: ICD-10-CM

## 2024-11-14 DIAGNOSIS — Z34.82 SUPERVISION OF NORMAL INTRAUTERINE PREGNANCY IN MULTIGRAVIDA IN SECOND TRIMESTER: Primary | ICD-10-CM

## 2024-11-14 PROCEDURE — T1001 NURSING ASSESSMENT/EVALUATN: HCPCS

## 2024-11-14 NOTE — PATIENT INSTRUCTIONS
Congratulations on your pregnancy!  We thank you for allowing us to participate in your care.    NEXT STEPS    Go to the lab to have your prenatal blood work competed, if you have not already done so.  We ask that you have this blood work done prior to your first routine prenatal appointment.  There is a listing of [facility] Laboratories and locations in your prenatal folder. You may also visit [email or web address] or call [phone].   Please be aware that some insurance companies may require you to go to a specific lab (ex. [facility] or [facility]). You can verify this by contacting your insurance company.   A referral was placed to Maternal Fetal Medicine for an ultrasound and or genetic testing.  You may have already scheduled or have had this appointment.  If not, please call their office to schedule.  Based on the referral placed by our office, they will know how to schedule you appropriately.    The phone number for Maternal Fetal Medicine is [phone]. For additional detailed contact information for Maternal Fetal Medicine, please refer to the prenatal folder provided to you by our office.   Return to our office for your first routine prenatal visit.   Some offices have multiple locations. Always check the address of your appointment to ensure you are going to the correct office.   If you experience any problems or concerns, call the office directly.   Remember to only use [employer] for none urgent concerns or questions.  Our doctors deliver at [facility] in [place]. The address is provided below.     [place] [id number]    Click on the links below to review our Pregnancy Essential Guide.  [facility] Pregnancy Essentials Guide  [facility] Women's Health ([email or web address])     Click on the link below to review [facility] Lab locations.  [facility] Lab Locations    [employer] resource  [employer] is a tool to connect you to community resources you may  need.    Thank you,  Keyla CHANDLER LPN  OB Nurse Navigator

## 2024-11-14 NOTE — PROGRESS NOTES
OB INTAKE INTERVIEW 2024    Patient is 44 y.o. who presents for OB intake at 19w6d.  She is not accompanied by anyone during this encounter.  The father of her baby (Endy Guan) is involved in the pregnancy and he is 39 years old.      Patient's last menstrual period was 2024 (exact date).  Ultrasound: Measured 17 weeks 3 days on 10/24/2024  Estimated Date of Delivery: 25 changed by 20 week US    Signs/Symptoms of Pregnancy  Current pregnancy symptoms: breast tenderness and frequent urination  Headaches no  Cramping no  Spotting no  PICA cravings no    Diabetes-  Body mass index is 27.62 kg/m².  If patient has 1 or more, please order early 1 hour GTT  History of GDM no  BMI >35 no  History of PCOS or current metformin use no  History of LGA/macrosomic infant (4000g/9lbs) no    If patient has 2 or more, please order early 1 hour GTT  BMI>30 no  AMA YES  First degree relative with type 2 diabetes no  History of chronic HTN, hyperlipidemia, elevated A1C no  High risk race (, , ,  or ) no    Hypertension- if you answer yes to any of the following, please order baseline preeclampsia labs (cbc, comprehensive metabolic panel, urine protein creatinine ratio, uric acid)  History of of chronic HTN no  History of gestational HTN no  History of preeclampsia, eclampsia, or HELLP syndrome no  History of diabetes no  History of lupus,sjogrens syndrome, kidney disease no    Thyroid- if yes order TSH with reflex T4  History of thyroid disease no    Bleeding Disorder or Hx of DVT-patient or first degree relative with history of. Order the following if not done previously.   (Factor V, antithrombin III, prothrombin gene mutation, protein C and S Ag, lupus anticoagulant, anticardiolipin, beta-2 glycoprotein)   no    OB/GYN-  History of abnormal pap smear no       Date of last pap smear Not on file-2 years ago in [place] per patient  History of HPV  no  History of Herpes/HSV no  History of other STI (gonorrhea, chlamydia, trich) no  History of prior  YESx4  History of prior  no  History of  delivery prior to 36 weeks 6 days no  History of blood transfusion no  Ok for blood transfusion  Yes    Substance screening-   History of tobacco use YES  Currently using tobacco no  Substance Use Screen Level:  N/A    MRSA Screening-   Does the pt have a hx of MRSA? no    Immunizations:  Influenza vaccine given this season: NO   History of Varicella or Vaccination: YES   Discussed Tdap vaccine:  YES  Discussed COVID Vaccine:  YES    Genetic/MFM-  Do you or your partner have a history of any of the following in yourselves or first degree relatives?  Cystic fibrosis no  Spinal muscular atrophy no  Hemoglobinopathy/Sickle Cell/Thalassemia no  Fragile X Intellectual Disability no    If yes, discuss Carrier Screening and recommend consultation with MFM/Genetic Counseling and place specific [facility] Referral for.    If no, discuss Carrier Screening being completed once in a lifetime as a standard of care lab test. Place orders for Cystic Fibrosis Gene Test (RHI857) and Spinal Muscular Atrophy DNA (DOM5614).  Patient was informed that prior authorization needs to be completed for these tests and this may take 7-10 business days.  Patient does not desire testing for Cystic Fibrosis and Spinal Muscular Atrophy.    Appointment for Ultrasound at [facility] was done on 2024.    Interview education  [facility] Pregnancy Essentials Book reviewed, discussed and attached to their AVS:  YES    Nurse/Family Partnership-patient may qualify NO; referral placed No     Prenatal lab work scripts YES    Extra labs ordered: Hgb Fractionation Cascade    Aspirin/Preeclampsia Screen    Risk Level Risk Factor Recommendation   LOW Prior Uncomplicated full-term delivery YES No Aspirin recommendation        MODERATE Nulliparity no Recommend low-dose aspirin if     BMI>30 no 2 or more moderate  "risk factors    Family History Preeclampsia (mother/sister) no     35yr old or greater YES     Black Race, Concern for SDOH/Low Socioeconomic no     IVF Pregnancy  no     Personal History Risks (low birth weight, prior adverse preg outcome, >10yr preg interval) YES         HIGH History of Preeclampsia no Recommend low-dose aspirin if     Multifetal gestation no 1 or more high risk factors    Chronic HTN no     Type 1 or 2 Diabetes no     Renal Disease no     Autoimmune Disease  no      Contraindications to ASA therapy:  NSAID/ ASA allergy: no  Nasal polyps: no  Asthma with history of ASA induced bronchospasm: no  Relative contraindications:  History of GI bleed: no  Active peptic ulcer disease: no  Severe hepatic dysfunction: no    Patient does meet recommendation to take ASA 162mg during this pregnancy from 12-36 wks to lower her risk of preeclampsia.  Instructions given and patient verbalized understanding.    Mental Health:  History of depression: no  History of anxiety: YES  EPDS Screen:  Negative   Score:  0    Dental Exam:  Last dental exam within the past 6 months: No    The patient has a history now or in prior pregnancy notable for: Rh negative status and AMA.    Details that I feel the provider should be aware of: Late prenatal care.  Reports \"I thought I was going through Menopause.     PN1 visit scheduled. The patient was oriented to our practice and the navigator role.  Reviewed delivering physicians and [facility] for delivery. All questions were answered.    Interviewed by: PASHA JACKSON LPN    "

## 2024-12-13 ENCOUNTER — APPOINTMENT (OUTPATIENT)
Dept: LAB | Facility: MEDICAL CENTER | Age: 44
End: 2024-12-13
Payer: COMMERCIAL

## 2024-12-13 DIAGNOSIS — Z34.82 PRENATAL CARE, SUBSEQUENT PREGNANCY, SECOND TRIMESTER: Primary | ICD-10-CM

## 2024-12-13 DIAGNOSIS — Z34.82 SUPERVISION OF NORMAL INTRAUTERINE PREGNANCY IN MULTIGRAVIDA IN SECOND TRIMESTER: ICD-10-CM

## 2024-12-13 DIAGNOSIS — O09.522 MULTIGRAVIDA OF ADVANCED MATERNAL AGE IN SECOND TRIMESTER: ICD-10-CM

## 2024-12-13 LAB
ABO GROUP BLD: NORMAL
BASOPHILS # BLD AUTO: 0.05 THOUSANDS/ÂΜL (ref 0–0.1)
BASOPHILS NFR BLD AUTO: 1 % (ref 0–1)
BILIRUB UR QL STRIP: NEGATIVE
BLD GP AB SCN SERPL QL: POSITIVE
CLARITY UR: CLEAR
COLOR UR: YELLOW
EOSINOPHIL # BLD AUTO: 0.24 THOUSAND/ÂΜL (ref 0–0.61)
EOSINOPHIL NFR BLD AUTO: 3 % (ref 0–6)
ERYTHROCYTE [DISTWIDTH] IN BLOOD BY AUTOMATED COUNT: 13.1 % (ref 11.6–15.1)
GLUCOSE UR STRIP-MCNC: NEGATIVE MG/DL
HBV SURFACE AB SER-ACNC: 4.83 MIU/ML
HBV SURFACE AG SER QL: NORMAL
HCT VFR BLD AUTO: 40.1 % (ref 34.8–46.1)
HCV AB SER QL: NORMAL
HGB BLD-MCNC: 13.2 G/DL (ref 11.5–15.4)
HGB UR QL STRIP.AUTO: NEGATIVE
HIV 1+2 AB+HIV1 P24 AG SERPL QL IA: NORMAL
HIV 2 AB SERPL QL IA: NORMAL
HIV1 AB SERPL QL IA: NORMAL
HIV1 P24 AG SERPL QL IA: NORMAL
IMM GRANULOCYTES # BLD AUTO: 0.08 THOUSAND/UL (ref 0–0.2)
IMM GRANULOCYTES NFR BLD AUTO: 1 % (ref 0–2)
KETONES UR STRIP-MCNC: NEGATIVE MG/DL
LEUKOCYTE ESTERASE UR QL STRIP: NEGATIVE
LYMPHOCYTES # BLD AUTO: 1.55 THOUSANDS/ÂΜL (ref 0.6–4.47)
LYMPHOCYTES NFR BLD AUTO: 16 % (ref 14–44)
MCH RBC QN AUTO: 30.2 PG (ref 26.8–34.3)
MCHC RBC AUTO-ENTMCNC: 32.9 G/DL (ref 31.4–37.4)
MCV RBC AUTO: 92 FL (ref 82–98)
MONOCYTES # BLD AUTO: 0.6 THOUSAND/ÂΜL (ref 0.17–1.22)
MONOCYTES NFR BLD AUTO: 6 % (ref 4–12)
NEUTROPHILS # BLD AUTO: 7.04 THOUSANDS/ÂΜL (ref 1.85–7.62)
NEUTS SEG NFR BLD AUTO: 73 % (ref 43–75)
NITRITE UR QL STRIP: NEGATIVE
NRBC BLD AUTO-RTO: 0 /100 WBCS
PH UR STRIP.AUTO: 6 [PH]
PLATELET # BLD AUTO: 234 THOUSANDS/UL (ref 149–390)
PMV BLD AUTO: 10.9 FL (ref 8.9–12.7)
PROT UR STRIP-MCNC: NEGATIVE MG/DL
RBC # BLD AUTO: 4.37 MILLION/UL (ref 3.81–5.12)
RH BLD: NEGATIVE
RUBV IGG SERPL IA-ACNC: 68.1 IU/ML
SP GR UR STRIP.AUTO: 1.02 (ref 1–1.03)
SPECIMEN EXPIRATION DATE: NORMAL
TREPONEMA PALLIDUM IGG+IGM AB [PRESENCE] IN SERUM OR PLASMA BY IMMUNOASSAY: NORMAL
UROBILINOGEN UR STRIP-ACNC: <2 MG/DL
WBC # BLD AUTO: 9.56 THOUSAND/UL (ref 4.31–10.16)

## 2024-12-13 PROCEDURE — 86706 HEP B SURFACE ANTIBODY: CPT

## 2024-12-13 PROCEDURE — 86803 HEPATITIS C AB TEST: CPT

## 2024-12-13 PROCEDURE — 87086 URINE CULTURE/COLONY COUNT: CPT

## 2024-12-13 PROCEDURE — 36415 COLL VENOUS BLD VENIPUNCTURE: CPT

## 2024-12-13 PROCEDURE — 86901 BLOOD TYPING SEROLOGIC RH(D): CPT

## 2024-12-13 PROCEDURE — 86762 RUBELLA ANTIBODY: CPT

## 2024-12-13 PROCEDURE — 86870 RBC ANTIBODY IDENTIFICATION: CPT

## 2024-12-13 PROCEDURE — 86780 TREPONEMA PALLIDUM: CPT

## 2024-12-13 PROCEDURE — 85025 COMPLETE CBC W/AUTO DIFF WBC: CPT

## 2024-12-13 PROCEDURE — 83020 HEMOGLOBIN ELECTROPHORESIS: CPT

## 2024-12-13 PROCEDURE — 87340 HEPATITIS B SURFACE AG IA: CPT

## 2024-12-13 PROCEDURE — 86900 BLOOD TYPING SEROLOGIC ABO: CPT

## 2024-12-13 PROCEDURE — 87389 HIV-1 AG W/HIV-1&-2 AB AG IA: CPT

## 2024-12-13 PROCEDURE — 86850 RBC ANTIBODY SCREEN: CPT

## 2024-12-14 ENCOUNTER — APPOINTMENT (OUTPATIENT)
Dept: LAB | Facility: CLINIC | Age: 44
End: 2024-12-14
Payer: COMMERCIAL

## 2024-12-14 DIAGNOSIS — Z34.82 PRENATAL CARE, SUBSEQUENT PREGNANCY, SECOND TRIMESTER: ICD-10-CM

## 2024-12-14 LAB — BLOOD GROUP ANTIBODIES SERPL: NORMAL

## 2024-12-14 PROCEDURE — 36415 COLL VENOUS BLD VENIPUNCTURE: CPT

## 2024-12-15 ENCOUNTER — RESULTS FOLLOW-UP (OUTPATIENT)
Dept: OBGYN CLINIC | Facility: MEDICAL CENTER | Age: 44
End: 2024-12-15

## 2024-12-15 LAB — BACTERIA UR CULT: NORMAL

## 2024-12-16 ENCOUNTER — INITIAL PRENATAL (OUTPATIENT)
Dept: OBGYN CLINIC | Facility: CLINIC | Age: 44
End: 2024-12-16
Payer: COMMERCIAL

## 2024-12-16 VITALS
DIASTOLIC BLOOD PRESSURE: 74 MMHG | WEIGHT: 174.2 LBS | SYSTOLIC BLOOD PRESSURE: 122 MMHG | BODY MASS INDEX: 29.02 KG/M2 | HEIGHT: 65 IN

## 2024-12-16 DIAGNOSIS — Z3A.26 26 WEEKS GESTATION OF PREGNANCY: ICD-10-CM

## 2024-12-16 DIAGNOSIS — O43.199 MARGINAL INSERTION OF UMBILICAL CORD AFFECTING MANAGEMENT OF MOTHER: Primary | ICD-10-CM

## 2024-12-16 LAB
HGB A MFR BLD: 2.2 % (ref 1.8–3.2)
HGB A MFR BLD: 97.8 % (ref 96.4–98.8)
HGB F MFR BLD: 0 % (ref 0–2)
HGB FRACT BLD-IMP: NORMAL
HGB S MFR BLD: 0 %

## 2024-12-16 PROCEDURE — 99213 OFFICE O/P EST LOW 20 MIN: CPT | Performed by: STUDENT IN AN ORGANIZED HEALTH CARE EDUCATION/TRAINING PROGRAM

## 2024-12-16 NOTE — PROGRESS NOTES
"Routine Prenatal Visit  [facility] [address] Marce Soto PA    Assessment/Plan:  Mariia is a 44 y.o. year old  at 26w1d who presents for routine prenatal visit.     1. Marginal insertion of umbilical cord affecting management of mother  2. 26 weeks gestation of pregnancy  -     RPR-Syphilis Screening (Total Syphilis IGG/IGM); Future  -     CBC and differential; Future  -     Anemia Panel w/Reflex, OB; Future  -     Glucose, 1H PG; Future  -     Type and screen; Future        Subjective:     CC: Prenatal care    Mariia Theodore is a 44 y.o.  female who presents for routine prenatal care at 26w1d.  Pregnancy ROS: Denies leakage of fluid, pelvic pain, or vaginal bleeding.  Reports fetal movement.    The following portions of the patient's history were reviewed and updated as appropriate: allergies, current medications, past family history, past medical history, obstetric history, gynecologic history, past social history, past surgical history and problem list.      Objective:  /74   Ht 5' 5\" (1.651 m)   Wt 79 kg (174 lb 3.2 oz)   LMP 2024 (Exact Date)   BMI 28.99 kg/m²   Pregravid Weight/BMI: 65.8 kg (145 lb) (BMI 24.13)  Current Weight: 79 kg (174 lb 3.2 oz)   Total Weight Gain: 13.2 kg (29 lb 3.2 oz)   Pre-Maral Vitals      Flowsheet Row Most Recent Value   Prenatal Assessment    Fetal Heart Rate 156   Movement Present   Prenatal Vitals    Blood Pressure 122/74   Weight - Scale 79 kg (174 lb 3.2 oz)   Urine Albumin/Glucose    Dilation/Effacement/Station    Vaginal Drainage    Draining Fluid No   Edema    LLE Edema None   RLE Edema None   Facial Edema None             General: Well appearing, no distress  Respiratory: Unlabored breathing  Cardiovascular: Regular rate.  Abdomen: Soft, gravid, nontender  Fundal Height: Appropriate for gestational age.  Extremities: Warm and well perfused.  Non tender.  "

## 2024-12-17 ENCOUNTER — TELEPHONE (OUTPATIENT)
Dept: OBGYN CLINIC | Facility: MEDICAL CENTER | Age: 44
End: 2024-12-17

## 2024-12-17 NOTE — TELEPHONE ENCOUNTER
Unable to lvm to let pt know we received notification that we will no longer be in network with her insurance as of January 1, 2025. To please call the back of her card to transfer to a provider that is in network with her insurance. We will not be able to see her unless she pays out of pocket for her care. We can send her records to wherever she transfers right away, to please let us know where she will be going.     Thank you.

## 2025-01-14 ENCOUNTER — TELEPHONE (OUTPATIENT)
Age: 45
End: 2025-01-14

## 2025-01-14 NOTE — TELEPHONE ENCOUNTER
Unable to LVM, I did send the following [employer] message - I was unable to leave you a voicemail, please call the office at [phone] to reschedule your 29wk appointment.

## 2025-02-04 PROBLEM — O09.523 MULTIGRAVIDA OF ADVANCED MATERNAL AGE IN THIRD TRIMESTER: Status: ACTIVE | Noted: 2025-02-04

## 2025-02-04 PROBLEM — Z3A.32 32 WEEKS GESTATION OF PREGNANCY: Status: ACTIVE | Noted: 2025-02-04

## 2025-02-05 ENCOUNTER — TELEPHONE (OUTPATIENT)
Dept: OBGYN CLINIC | Facility: CLINIC | Age: 45
End: 2025-02-05

## 2025-02-05 NOTE — TELEPHONE ENCOUNTER
Called patient for third trimester assessment.  Mailbox is full and could not leave a message.  Patient has not been seen for routine prenatal care since 12/16/2024 and has not completed third trimester labs.  Has an appt scheduled today.

## 2025-02-07 ENCOUNTER — TELEPHONE (OUTPATIENT)
Age: 45
End: 2025-02-07

## 2025-02-10 ENCOUNTER — TELEPHONE (OUTPATIENT)
Dept: PERINATAL CARE | Facility: OTHER | Age: 45
End: 2025-02-10

## 2025-02-11 ENCOUNTER — TELEPHONE (OUTPATIENT)
Age: 45
End: 2025-02-11

## 2025-02-11 NOTE — TELEPHONE ENCOUNTER
Patient submitted online appt request via [facility] [place] landing page (web) on 2/8.     Attempted to call pt, vm box full, unable to leave message. Appears MFM sent [employer] message. Pt possibly requesting to r/s growth US but need to confirm if request is for MFM or obgyn.

## 2025-02-14 NOTE — TELEPHONE ENCOUNTER
2nd attempt to call pt regarding online appt request. Voicemail box full, unable to leave message.

## 2025-02-18 ENCOUNTER — ROUTINE PRENATAL (OUTPATIENT)
Dept: OBGYN CLINIC | Facility: CLINIC | Age: 45
End: 2025-02-18
Payer: COMMERCIAL

## 2025-02-18 VITALS
DIASTOLIC BLOOD PRESSURE: 68 MMHG | SYSTOLIC BLOOD PRESSURE: 116 MMHG | WEIGHT: 186.2 LBS | HEIGHT: 65 IN | BODY MASS INDEX: 31.02 KG/M2

## 2025-02-18 DIAGNOSIS — F17.200 TOBACCO USE DISORDER: ICD-10-CM

## 2025-02-18 DIAGNOSIS — O09.523 ADVANCED MATERNAL AGE IN MULTIGRAVIDA, THIRD TRIMESTER: Primary | ICD-10-CM

## 2025-02-18 DIAGNOSIS — Z3A.35 35 WEEKS GESTATION OF PREGNANCY: ICD-10-CM

## 2025-02-18 DIAGNOSIS — O09.33 INSUFFICIENT PRENATAL CARE IN THIRD TRIMESTER: ICD-10-CM

## 2025-02-18 PROCEDURE — 99214 OFFICE O/P EST MOD 30 MIN: CPT | Performed by: STUDENT IN AN ORGANIZED HEALTH CARE EDUCATION/TRAINING PROGRAM

## 2025-02-18 PROCEDURE — 87150 DNA/RNA AMPLIFIED PROBE: CPT | Performed by: STUDENT IN AN ORGANIZED HEALTH CARE EDUCATION/TRAINING PROGRAM

## 2025-02-18 NOTE — PROGRESS NOTES
"Name: Mariia Theodore      : 1980      MRN: [id number]  Encounter Provider: Yolanda Colbert MD  Encounter Date: 2025   Encounter department: [facility] [place]  :  Assessment & Plan  Advanced maternal age in multigravida, third trimester         Insufficient prenatal care in third trimester         Tobacco use disorder         35 weeks gestation of pregnancy  - Encouraged completion of 3rd trimester labs including glucola- discussed risks of undiagnosed GDM  - GBS collected  - Encouraged her to call [facility] back to schedule growth US, fundal height is advanced concern for fetal macrosomia  - Discussed contraceptive options- she declines tubal or LARC    Orders:    Strep B DNA probe, amplification        History of Present Illness   The patient denies leakage of fluid, pelvic pain, or vaginal bleeding.  Reports fetal movement.        Mariia Theodore is a 44 y.o. female who presents for routine prenatal care.    History obtained from: patient    Review of Systems   Constitutional:  Negative for chills and fever.   Respiratory:  Negative for cough and shortness of breath.    Cardiovascular:  Negative for chest pain and leg swelling.   Gastrointestinal:  Negative for abdominal pain, nausea and vomiting.   Genitourinary:  Negative for dysuria, frequency and urgency.   Neurological:  Negative for dizziness, light-headedness and headaches.     Medical History Reviewed by provider this encounter:     .     Objective   /68   Ht 5' 5\" (1.651 m)   Wt 84.5 kg (186 lb 3.2 oz)   LMP 2024 (Exact Date)   BMI 30.99 kg/m²      Physical Exam  Constitutional:       Appearance: Normal appearance.   HENT:      Head: Normocephalic and atraumatic.   Cardiovascular:      Rate and Rhythm: Normal rate.   Pulmonary:      Effort: Pulmonary effort is normal.   Skin:     General: Skin is warm.   Neurological:      General: No focal deficit present.      Mental Status: She is alert. "   Psychiatric:         Mood and Affect: Mood normal.             Yolanda Colbert MD  [facility]  [facility]  2/18/2025 3:26 PM

## 2025-02-18 NOTE — TELEPHONE ENCOUNTER
3rd attempt to call pt regarding online appt request. Voicemail box full, unable to leave message.

## 2025-02-18 NOTE — ASSESSMENT & PLAN NOTE
- Encouraged completion of 3rd trimester labs including glucola- discussed risks of undiagnosed GDM  - GBS collected  - Encouraged her to call MFM back to schedule growth US, fundal height is advanced concern for fetal macrosomia  - Discussed contraceptive options- she declines tubal or LARC    Orders:    Strep B DNA probe, amplification

## 2025-02-19 ENCOUNTER — TELEPHONE (OUTPATIENT)
Dept: OBGYN CLINIC | Facility: CLINIC | Age: 45
End: 2025-02-19

## 2025-02-19 NOTE — TELEPHONE ENCOUNTER
Tried to reach out to Mariia  regarding scheduled her MFM growth scan and NSTs/MINNIE. Her mailbox was full and was unable to leave a message.     She needs to schedule another ultrasound and schedule Nst/Minnie for the week of 3/10 and 3/18 with maternal fetal medicine. We are unable to schedule them in the office.     Will send a [employer] message also.

## 2025-02-20 ENCOUNTER — RESULTS FOLLOW-UP (OUTPATIENT)
Dept: OBGYN CLINIC | Facility: MEDICAL CENTER | Age: 45
End: 2025-02-20

## 2025-02-20 LAB — GP B STREP DNA SPEC QL NAA+PROBE: POSITIVE

## 2025-03-05 ENCOUNTER — APPOINTMENT (OUTPATIENT)
Dept: LAB | Facility: HOSPITAL | Age: 45
End: 2025-03-05
Attending: STUDENT IN AN ORGANIZED HEALTH CARE EDUCATION/TRAINING PROGRAM
Payer: COMMERCIAL

## 2025-03-05 DIAGNOSIS — Z3A.26 26 WEEKS GESTATION OF PREGNANCY: ICD-10-CM

## 2025-03-05 LAB
ABO GROUP BLD: NORMAL
BASOPHILS # BLD AUTO: 0.03 THOUSANDS/ÂΜL (ref 0–0.1)
BASOPHILS NFR BLD AUTO: 0 % (ref 0–1)
BLD GP AB SCN SERPL QL: NEGATIVE
EOSINOPHIL # BLD AUTO: 0.09 THOUSAND/ÂΜL (ref 0–0.61)
EOSINOPHIL NFR BLD AUTO: 1 % (ref 0–6)
ERYTHROCYTE [DISTWIDTH] IN BLOOD BY AUTOMATED COUNT: 13.4 % (ref 11.6–15.1)
HCT VFR BLD AUTO: 35.4 % (ref 34.8–46.1)
HGB BLD-MCNC: 11.7 G/DL (ref 11.5–15.4)
IMM GRANULOCYTES # BLD AUTO: 0.06 THOUSAND/UL (ref 0–0.2)
IMM GRANULOCYTES NFR BLD AUTO: 1 % (ref 0–2)
LYMPHOCYTES # BLD AUTO: 1.59 THOUSANDS/ÂΜL (ref 0.6–4.47)
LYMPHOCYTES NFR BLD AUTO: 19 % (ref 14–44)
MCH RBC QN AUTO: 29.6 PG (ref 26.8–34.3)
MCHC RBC AUTO-ENTMCNC: 33.1 G/DL (ref 31.4–37.4)
MCV RBC AUTO: 90 FL (ref 82–98)
MONOCYTES # BLD AUTO: 0.71 THOUSAND/ÂΜL (ref 0.17–1.22)
MONOCYTES NFR BLD AUTO: 9 % (ref 4–12)
NEUTROPHILS # BLD AUTO: 5.91 THOUSANDS/ÂΜL (ref 1.85–7.62)
NEUTS SEG NFR BLD AUTO: 70 % (ref 43–75)
NRBC BLD AUTO-RTO: 0 /100 WBCS
PLATELET # BLD AUTO: 149 THOUSANDS/UL (ref 149–390)
PMV BLD AUTO: 11.5 FL (ref 8.9–12.7)
RBC # BLD AUTO: 3.95 MILLION/UL (ref 3.81–5.12)
RH BLD: NEGATIVE
SPECIMEN EXPIRATION DATE: NORMAL
WBC # BLD AUTO: 8.39 THOUSAND/UL (ref 4.31–10.16)

## 2025-03-05 PROCEDURE — 86900 BLOOD TYPING SEROLOGIC ABO: CPT

## 2025-03-05 PROCEDURE — 86901 BLOOD TYPING SEROLOGIC RH(D): CPT

## 2025-03-05 PROCEDURE — 36415 COLL VENOUS BLD VENIPUNCTURE: CPT

## 2025-03-05 PROCEDURE — 86850 RBC ANTIBODY SCREEN: CPT

## 2025-03-05 PROCEDURE — 86780 TREPONEMA PALLIDUM: CPT

## 2025-03-05 PROCEDURE — 85025 COMPLETE CBC W/AUTO DIFF WBC: CPT

## 2025-03-06 ENCOUNTER — RESULTS FOLLOW-UP (OUTPATIENT)
Dept: OBGYN CLINIC | Facility: MEDICAL CENTER | Age: 45
End: 2025-03-06

## 2025-03-06 ENCOUNTER — PATIENT MESSAGE (OUTPATIENT)
Dept: OBGYN CLINIC | Facility: CLINIC | Age: 45
End: 2025-03-06

## 2025-03-06 ENCOUNTER — ROUTINE PRENATAL (OUTPATIENT)
Dept: OBGYN CLINIC | Facility: CLINIC | Age: 45
End: 2025-03-06
Payer: COMMERCIAL

## 2025-03-06 VITALS — DIASTOLIC BLOOD PRESSURE: 70 MMHG | WEIGHT: 194 LBS | BODY MASS INDEX: 32.28 KG/M2 | SYSTOLIC BLOOD PRESSURE: 118 MMHG

## 2025-03-06 DIAGNOSIS — O43.199 MARGINAL INSERTION OF UMBILICAL CORD AFFECTING MANAGEMENT OF MOTHER: ICD-10-CM

## 2025-03-06 DIAGNOSIS — O09.523 MULTIGRAVIDA OF ADVANCED MATERNAL AGE IN THIRD TRIMESTER: ICD-10-CM

## 2025-03-06 DIAGNOSIS — Z34.93 THIRD TRIMESTER PREGNANCY: Primary | ICD-10-CM

## 2025-03-06 DIAGNOSIS — Z3A.37 37 WEEKS GESTATION OF PREGNANCY: ICD-10-CM

## 2025-03-06 LAB — TREPONEMA PALLIDUM IGG+IGM AB [PRESENCE] IN SERUM OR PLASMA BY IMMUNOASSAY: NORMAL

## 2025-03-06 PROCEDURE — 59025 FETAL NON-STRESS TEST: CPT | Performed by: OBSTETRICS & GYNECOLOGY

## 2025-03-06 PROCEDURE — 99214 OFFICE O/P EST MOD 30 MIN: CPT | Performed by: OBSTETRICS & GYNECOLOGY

## 2025-03-06 PROCEDURE — 76815 OB US LIMITED FETUS(S): CPT | Performed by: OBSTETRICS & GYNECOLOGY

## 2025-03-06 NOTE — PATIENT COMMUNICATION
ECV scheduled by Luz Maria Bauman.  Called patient to inform via phone.  Unable to leave message on voicemail.

## 2025-03-06 NOTE — PROGRESS NOTES
Assessment  44 y.o.  at 37w4d presenting for routine prenatal visit.     Plan  Diagnoses and all orders for this visit:    Third trimester pregnancy  37 weeks gestation of pregnancy  - Labor precautions  - FKC  - Return in 1wk for PN/NST/ARNULFO    Multigravida of advanced maternal age in third trimester  Marginal insertion of umbilical cord affecting management of mother  - Ongoing weekly APFS  - NS 3rd tri growth    Breech presentation, single or unspecified fetus  - Unexpected on ARNULFO today  - Prefers ECV, task sent for scheduling      ____________________________________________________________        Subjective    Crystal Arben is a 44 y.o.  at 37w4d who presents for routine prenatal visit. She is without complaint. She has dirk jarvis when active, but denies regular contractions, loss of fluid, or vaginal bleeding. She feels regular fetal movements.     Patient with breech baby at last US. Discussed risks of breech vaginal delivery, recommendations against this, and management of breech presentation -- 1LTCS and ECV. Patient would prefer to have vaginal delivery if possible. Discussed risks of ECV -- failure, success then reversion to breech, abnormal fetal heart tones, ROM, placental abruption, injury to fetus, need for immediate delivery. Desires ECV.      Pregnancy Problems:  Patient Active Problem List   Diagnosis    Tobacco use disorder    Closed fracture of one rib of left side    Closed fracture of nasal bone    Marginal insertion of umbilical cord affecting management of mother    37 weeks gestation of pregnancy    Multigravida of advanced maternal age in third trimester         Objective  /70   Wt 88 kg (194 lb)   LMP 2024 (Exact Date)   BMI 32.28 kg/m²     NST:  [place]:  ARNULFO: 125 BPM / moderate / +accels  Quiet  11.9cmcm   Uterine Size: size equals dates   Presentations: breech by TAUS   Pelvic Exam:     Dilation: 1cm    Effacement: 50%    Station:  -3    Consistency:  firm    Position: middle     Physical Exam:  Physical Exam  Constitutional:       General: She is not in acute distress.     Appearance: Normal appearance. She is well-developed. She is not ill-appearing, toxic-appearing or diaphoretic.   HENT:      Head: Normocephalic and atraumatic.   Eyes:      General: No scleral icterus.        Right eye: No discharge.         Left eye: No discharge.      Conjunctiva/sclera: Conjunctivae normal.   Pulmonary:      Effort: Pulmonary effort is normal. No accessory muscle usage or respiratory distress.   Abdominal:      General: There is distension (gravid).      Tenderness: There is no abdominal tenderness. There is no guarding or rebound.   Skin:     General: Skin is warm and dry.      Coloration: Skin is not jaundiced.      Findings: No bruising, erythema or rash.   Neurological:      Mental Status: She is alert.   Psychiatric:         Mood and Affect: Mood normal.         Behavior: Behavior normal.         Thought Content: Thought content normal.         Judgment: Judgment normal.

## 2025-03-07 NOTE — PROGRESS NOTES
Chart sent for ECV scheduling. Planned 3/9, however on review at L&D concerns noted on ROBERT accuracy. ROBERT 3/23/25 by LMP, confirmed by 2nd tri US in setting of late est care. Concordant with MFM US to follow, however in setting of late est care and >10d difference on scan, they support using US dating. ROBERT set to 4/4/25. Will reschedule ECV for 37w.

## 2025-03-07 NOTE — PATIENT COMMUNICATION
Called to reschedule ECV due to EDC change per Dr. Culp.  Schedule for 3/15/2025 at 10 am procedure time with a 9 am arrival time.  Called aware.

## 2025-03-10 ENCOUNTER — ROUTINE PRENATAL (OUTPATIENT)
Dept: OBGYN CLINIC | Facility: CLINIC | Age: 45
End: 2025-03-10
Payer: COMMERCIAL

## 2025-03-10 VITALS — SYSTOLIC BLOOD PRESSURE: 126 MMHG | BODY MASS INDEX: 31.78 KG/M2 | DIASTOLIC BLOOD PRESSURE: 80 MMHG | WEIGHT: 191 LBS

## 2025-03-10 DIAGNOSIS — O09.33 INSUFFICIENT PRENATAL CARE IN THIRD TRIMESTER: ICD-10-CM

## 2025-03-10 DIAGNOSIS — Z3A.36 36 WEEKS GESTATION OF PREGNANCY: ICD-10-CM

## 2025-03-10 DIAGNOSIS — O09.523 MULTIGRAVIDA OF ADVANCED MATERNAL AGE IN THIRD TRIMESTER: Primary | ICD-10-CM

## 2025-03-10 DIAGNOSIS — Z34.93 THIRD TRIMESTER PREGNANCY: ICD-10-CM

## 2025-03-10 DIAGNOSIS — O43.199 MARGINAL INSERTION OF UMBILICAL CORD AFFECTING MANAGEMENT OF MOTHER: ICD-10-CM

## 2025-03-10 PROCEDURE — 99213 OFFICE O/P EST LOW 20 MIN: CPT | Performed by: ADVANCED PRACTICE MIDWIFE

## 2025-03-10 NOTE — ASSESSMENT & PLAN NOTE
Reviewed s/s labor, FKC  - GBS positive  - needs to complete 1 hr gtt  - declined weekly NST/ARNULFO today  -To schedule growth with MFM  - next visit 1 week

## 2025-03-10 NOTE — PROGRESS NOTES
Name: Mariia Theodore      : 1980      MRN: [id number]  Encounter Provider: Jessica Meyer CNM  Encounter Date: 3/10/2025   Encounter department: [facility] [place]  :  Assessment & Plan  Multigravida of advanced maternal age in third trimester         Marginal insertion of umbilical cord affecting management of mother         Third trimester pregnancy         36 weeks gestation of pregnancy  Reviewed s/s labor, FKC  - GBS positive  - needs to complete 1 hr gtt  - declined weekly NST/ARNULFO today  -To schedule growth with MFM  - next visit 1 week       Insufficient prenatal care in third trimester         Breech presentation, single or unspecified fetus  ECV is scheduled for 3/15/25           History of Present Illness   Mariia Theodore is a 44 y.o.  female who presents for routine prenatal care at 36w3d.  Pregnancy ROS: no leakage of fluid, pelvic pain, or vaginal bleeding.  Good fetal movement.  Declined NST/ARNULFO today    Objective:  /80   Wt 86.6 kg (191 lb)   LMP 2024 (Exact Date)   BMI 31.78 kg/m²   Pregravid Weight/BMI: 65.8 kg (145 lb) (BMI 24.13)  Current Weight: 86.6 kg (191 lb)   Total Weight Gain: 20.9 kg (46 lb)   Pre-Maral Vitals    Flowsheet Row Most Recent Value   Prenatal Assessment    Movement Present   Prenatal Vitals    Blood Pressure 126/80   Weight - Scale 86.6 kg (191 lb)   Urine Albumin/Glucose    Dilation/Effacement/Station    Vaginal Drainage    Draining Fluid No   Edema    LLE Edema None   RLE Edema None           Mariia Theodore is a 44 y.o. female who presents prenatal visit  History obtained from: patient    Review of Systems   Constitutional:  Negative for chills and fever.   Respiratory:  Negative for cough and shortness of breath.    Cardiovascular:  Negative for chest pain and palpitations.   Genitourinary:  Negative for difficulty urinating and vaginal bleeding.   Psychiatric/Behavioral:  The patient is not nervous/anxious.       Medical History Reviewed by provider this encounter:     .  Current Outpatient Medications on File Prior to Visit   Medication Sig Dispense Refill   • Prenatal Multivit-Min-Fe-FA (PRENATAL 1 + IRON PO) Take by mouth       No current facility-administered medications on file prior to visit.         Objective   /80   Wt 86.6 kg (191 lb)   LMP 06/16/2024 (Exact Date)   BMI 31.78 kg/m²      Physical Exam  Vitals reviewed.   Constitutional:       Appearance: Normal appearance.   Pulmonary:      Effort: Pulmonary effort is normal.   Abdominal:      Comments: Gravid uterus   Neurological:      Mental Status: She is alert and oriented to person, place, and time.   Psychiatric:         Mood and Affect: Mood normal.         Behavior: Behavior normal.

## 2025-03-13 ENCOUNTER — ANESTHESIA EVENT (OUTPATIENT)
Dept: LABOR AND DELIVERY | Facility: HOSPITAL | Age: 45
DRG: 540 | End: 2025-03-13
Payer: COMMERCIAL

## 2025-03-15 ENCOUNTER — ANESTHESIA (OUTPATIENT)
Dept: LABOR AND DELIVERY | Facility: HOSPITAL | Age: 45
DRG: 540 | End: 2025-03-15
Payer: COMMERCIAL

## 2025-03-15 ENCOUNTER — HOSPITAL ENCOUNTER (INPATIENT)
Facility: HOSPITAL | Age: 45
LOS: 2 days | Discharge: HOME | DRG: 540 | End: 2025-03-17
Attending: OBSTETRICS & GYNECOLOGY | Admitting: OBSTETRICS & GYNECOLOGY
Payer: COMMERCIAL

## 2025-03-15 ENCOUNTER — HOSPITAL ENCOUNTER (OUTPATIENT)
Dept: LABOR AND DELIVERY | Facility: HOSPITAL | Age: 45
Discharge: HOME | DRG: 540 | End: 2025-03-15
Payer: COMMERCIAL

## 2025-03-15 DIAGNOSIS — Z98.891 S/P CESAREAN SECTION: ICD-10-CM

## 2025-03-15 DIAGNOSIS — D62 ACUTE BLOOD LOSS ANEMIA: ICD-10-CM

## 2025-03-15 DIAGNOSIS — O13.9 GESTATIONAL HYPERTENSION: Primary | ICD-10-CM

## 2025-03-15 PROBLEM — O99.210 OBESITY AFFECTING PREGNANCY: Status: ACTIVE | Noted: 2025-03-15

## 2025-03-15 LAB
ABO GROUP BLD: NORMAL
ALBUMIN SERPL BCG-MCNC: 3.3 G/DL (ref 3.5–5)
ALP SERPL-CCNC: 104 U/L (ref 34–104)
ALT SERPL W P-5'-P-CCNC: 12 U/L (ref 7–52)
ANION GAP SERPL CALCULATED.3IONS-SCNC: 9 MMOL/L (ref 4–13)
AST SERPL W P-5'-P-CCNC: 11 U/L (ref 13–39)
BASE EXCESS BLDCOA CALC-SCNC: -3.3 MMOL/L (ref 3–11)
BASE EXCESS BLDCOV CALC-SCNC: -3.5 MMOL/L (ref 1–9)
BILIRUB SERPL-MCNC: 0.3 MG/DL (ref 0.2–1)
BLD GP AB SCN SERPL QL: NEGATIVE
BUN SERPL-MCNC: 9 MG/DL (ref 5–25)
CALCIUM ALBUM COR SERPL-MCNC: 8.9 MG/DL (ref 8.3–10.1)
CALCIUM SERPL-MCNC: 8.3 MG/DL (ref 8.4–10.2)
CHLORIDE SERPL-SCNC: 105 MMOL/L (ref 96–108)
CO2 SERPL-SCNC: 20 MMOL/L (ref 21–32)
CREAT SERPL-MCNC: 0.63 MG/DL (ref 0.6–1.3)
CREAT UR-MCNC: 108.7 MG/DL
ERYTHROCYTE [DISTWIDTH] IN BLOOD BY AUTOMATED COUNT: 13.7 % (ref 11.6–15.1)
GFR SERPL CREATININE-BSD FRML MDRD: 110 ML/MIN/1.73SQ M
GLUCOSE P FAST SERPL-MCNC: 85 MG/DL (ref 65–99)
GLUCOSE SERPL-MCNC: 85 MG/DL (ref 65–140)
HCO3 BLDCOA-SCNC: 22.1 MMOL/L (ref 17.3–27.3)
HCO3 BLDCOV-SCNC: 21.2 MMOL/L (ref 12.2–28.6)
HCT VFR BLD AUTO: 37.8 % (ref 34.8–46.1)
HGB BLD-MCNC: 12.4 G/DL (ref 11.5–15.4)
MCH RBC QN AUTO: 28.4 PG (ref 26.8–34.3)
MCHC RBC AUTO-ENTMCNC: 32.8 G/DL (ref 31.4–37.4)
MCV RBC AUTO: 87 FL (ref 82–98)
O2 CT VFR BLDCOA CALC: 8 ML/DL
OXYHGB MFR BLDCOA: 36.1 %
OXYHGB MFR BLDCOV: 40.5 %
PCO2 BLDCOA: 40.7 MM[HG] (ref 30–60)
PCO2 BLDCOV: 37.6 MM HG (ref 27–43)
PH BLDCOA: 7.35 [PH] (ref 7.23–7.43)
PH BLDCOV: 7.37 [PH] (ref 7.19–7.49)
PLATELET # BLD AUTO: 194 THOUSANDS/UL (ref 149–390)
PMV BLD AUTO: 12.1 FL (ref 8.9–12.7)
PO2 BLDCOA: 16.4 MM HG (ref 5–25)
PO2 BLDCOV: 16.9 MM HG (ref 15–45)
POTASSIUM SERPL-SCNC: 4 MMOL/L (ref 3.5–5.3)
PROT SERPL-MCNC: 6.4 G/DL (ref 6.4–8.4)
PROT UR-MCNC: 17.5 MG/DL
PROT/CREAT UR: 0.2 MG/G{CREAT} (ref 0–0.1)
RBC # BLD AUTO: 4.36 MILLION/UL (ref 3.81–5.12)
RH BLD: NEGATIVE
SAO2 % BLDCOV: 8.9 ML/DL
SODIUM SERPL-SCNC: 134 MMOL/L (ref 135–147)
SPECIMEN EXPIRATION DATE: NORMAL
WBC # BLD AUTO: 11.38 THOUSAND/UL (ref 4.31–10.16)

## 2025-03-15 PROCEDURE — 86850 RBC ANTIBODY SCREEN: CPT

## 2025-03-15 PROCEDURE — 82570 ASSAY OF URINE CREATININE: CPT | Performed by: OBSTETRICS & GYNECOLOGY

## 2025-03-15 PROCEDURE — 59514 CESAREAN DELIVERY ONLY: CPT | Performed by: OBSTETRICS & GYNECOLOGY

## 2025-03-15 PROCEDURE — 85027 COMPLETE CBC AUTOMATED: CPT

## 2025-03-15 PROCEDURE — 88307 TISSUE EXAM BY PATHOLOGIST: CPT | Performed by: PATHOLOGY

## 2025-03-15 PROCEDURE — 86901 BLOOD TYPING SEROLOGIC RH(D): CPT

## 2025-03-15 PROCEDURE — NC001 PR NO CHARGE: Performed by: OBSTETRICS & GYNECOLOGY

## 2025-03-15 PROCEDURE — 86780 TREPONEMA PALLIDUM: CPT

## 2025-03-15 PROCEDURE — 59412 ANTEPARTUM MANIPULATION: CPT

## 2025-03-15 PROCEDURE — 82805 BLOOD GASES W/O2 SATURATION: CPT | Performed by: OBSTETRICS & GYNECOLOGY

## 2025-03-15 PROCEDURE — 4A1HXCZ MONITORING OF PRODUCTS OF CONCEPTION, CARDIAC RATE, EXTERNAL APPROACH: ICD-10-PCS | Performed by: OBSTETRICS & GYNECOLOGY

## 2025-03-15 PROCEDURE — 86900 BLOOD TYPING SEROLOGIC ABO: CPT

## 2025-03-15 PROCEDURE — 84156 ASSAY OF PROTEIN URINE: CPT | Performed by: OBSTETRICS & GYNECOLOGY

## 2025-03-15 PROCEDURE — 80053 COMPREHEN METABOLIC PANEL: CPT | Performed by: OBSTETRICS & GYNECOLOGY

## 2025-03-15 RX ORDER — CEFAZOLIN SODIUM 2 G/50ML
2000 SOLUTION INTRAVENOUS ONCE
Status: COMPLETED | OUTPATIENT
Start: 2025-03-15 | End: 2025-03-15

## 2025-03-15 RX ORDER — IBUPROFEN 600 MG/1
600 TABLET, FILM COATED ORAL EVERY 6 HOURS
Status: DISCONTINUED | OUTPATIENT
Start: 2025-03-17 | End: 2025-03-17 | Stop reason: HOSPADM

## 2025-03-15 RX ORDER — OXYTOCIN/RINGER'S LACTATE 30/500 ML
PLASTIC BAG, INJECTION (ML) INTRAVENOUS
Status: COMPLETED
Start: 2025-03-15 | End: 2025-03-15

## 2025-03-15 RX ORDER — MORPHINE SULFATE 0.5 MG/ML
INJECTION, SOLUTION EPIDURAL; INTRATHECAL; INTRAVENOUS
Status: COMPLETED
Start: 2025-03-15 | End: 2025-03-15

## 2025-03-15 RX ORDER — ONDANSETRON 2 MG/ML
INJECTION INTRAMUSCULAR; INTRAVENOUS AS NEEDED
Status: DISCONTINUED | OUTPATIENT
Start: 2025-03-15 | End: 2025-03-15

## 2025-03-15 RX ORDER — MIDAZOLAM HYDROCHLORIDE 2 MG/2ML
INJECTION, SOLUTION INTRAMUSCULAR; INTRAVENOUS
Status: DISPENSED
Start: 2025-03-15 | End: 2025-03-15

## 2025-03-15 RX ORDER — SIMETHICONE 80 MG
80 TABLET,CHEWABLE ORAL 4 TIMES DAILY PRN
Status: DISCONTINUED | OUTPATIENT
Start: 2025-03-15 | End: 2025-03-17 | Stop reason: HOSPADM

## 2025-03-15 RX ORDER — MIDAZOLAM HYDROCHLORIDE 2 MG/2ML
INJECTION, SOLUTION INTRAMUSCULAR; INTRAVENOUS CONTINUOUS PRN
Status: DISCONTINUED | OUTPATIENT
Start: 2025-03-15 | End: 2025-03-15

## 2025-03-15 RX ORDER — ONDANSETRON 2 MG/ML
4 INJECTION INTRAMUSCULAR; INTRAVENOUS EVERY 6 HOURS PRN
Status: DISCONTINUED | OUTPATIENT
Start: 2025-03-15 | End: 2025-03-15

## 2025-03-15 RX ORDER — LIDOCAINE HYDROCHLORIDE AND EPINEPHRINE 15; 5 MG/ML; UG/ML
INJECTION, SOLUTION EPIDURAL
Status: COMPLETED | OUTPATIENT
Start: 2025-03-15 | End: 2025-03-15

## 2025-03-15 RX ORDER — NALBUPHINE HYDROCHLORIDE 10 MG/ML
5 INJECTION INTRAMUSCULAR; INTRAVENOUS; SUBCUTANEOUS
Status: DISCONTINUED | OUTPATIENT
Start: 2025-03-15 | End: 2025-03-15

## 2025-03-15 RX ORDER — FENTANYL CITRATE 50 UG/ML
INJECTION, SOLUTION INTRAMUSCULAR; INTRAVENOUS AS NEEDED
Status: DISCONTINUED | OUTPATIENT
Start: 2025-03-15 | End: 2025-03-15

## 2025-03-15 RX ORDER — CALCIUM CARBONATE 500 MG/1
1000 TABLET, CHEWABLE ORAL DAILY PRN
Status: DISCONTINUED | OUTPATIENT
Start: 2025-03-15 | End: 2025-03-17 | Stop reason: HOSPADM

## 2025-03-15 RX ORDER — ENOXAPARIN SODIUM 100 MG/ML
40 INJECTION SUBCUTANEOUS DAILY
Status: DISCONTINUED | OUTPATIENT
Start: 2025-03-16 | End: 2025-03-17 | Stop reason: HOSPADM

## 2025-03-15 RX ORDER — ONDANSETRON 2 MG/ML
INJECTION INTRAMUSCULAR; INTRAVENOUS
Status: COMPLETED
Start: 2025-03-15 | End: 2025-03-15

## 2025-03-15 RX ORDER — EPHEDRINE SULFATE 50 MG/ML
INJECTION INTRAVENOUS AS NEEDED
Status: DISCONTINUED | OUTPATIENT
Start: 2025-03-15 | End: 2025-03-15

## 2025-03-15 RX ORDER — ONDANSETRON 2 MG/ML
4 INJECTION INTRAMUSCULAR; INTRAVENOUS EVERY 8 HOURS PRN
Status: DISCONTINUED | OUTPATIENT
Start: 2025-03-15 | End: 2025-03-17 | Stop reason: HOSPADM

## 2025-03-15 RX ORDER — SODIUM CHLORIDE, SODIUM LACTATE, POTASSIUM CHLORIDE, CALCIUM CHLORIDE 600; 310; 30; 20 MG/100ML; MG/100ML; MG/100ML; MG/100ML
125 INJECTION, SOLUTION INTRAVENOUS CONTINUOUS
Status: DISCONTINUED | OUTPATIENT
Start: 2025-03-15 | End: 2025-03-17 | Stop reason: HOSPADM

## 2025-03-15 RX ORDER — OXYTOCIN/RINGER'S LACTATE 30/500 ML
PLASTIC BAG, INJECTION (ML) INTRAVENOUS CONTINUOUS PRN
Status: DISCONTINUED | OUTPATIENT
Start: 2025-03-15 | End: 2025-03-15

## 2025-03-15 RX ORDER — OXYTOCIN/RINGER'S LACTATE 30/500 ML
62.5 PLASTIC BAG, INJECTION (ML) INTRAVENOUS ONCE
Status: COMPLETED | OUTPATIENT
Start: 2025-03-15 | End: 2025-03-15

## 2025-03-15 RX ORDER — SODIUM CHLORIDE, SODIUM LACTATE, POTASSIUM CHLORIDE, CALCIUM CHLORIDE 600; 310; 30; 20 MG/100ML; MG/100ML; MG/100ML; MG/100ML
125 INJECTION, SOLUTION INTRAVENOUS CONTINUOUS
Status: DISCONTINUED | OUTPATIENT
Start: 2025-03-15 | End: 2025-03-15

## 2025-03-15 RX ORDER — NALOXONE HYDROCHLORIDE 0.4 MG/ML
0.1 INJECTION, SOLUTION INTRAMUSCULAR; INTRAVENOUS; SUBCUTANEOUS
Status: DISCONTINUED | OUTPATIENT
Start: 2025-03-15 | End: 2025-03-15

## 2025-03-15 RX ORDER — MORPHINE SULFATE 0.5 MG/ML
INJECTION, SOLUTION EPIDURAL; INTRATHECAL; INTRAVENOUS AS NEEDED
Status: DISCONTINUED | OUTPATIENT
Start: 2025-03-15 | End: 2025-03-15

## 2025-03-15 RX ORDER — TERBUTALINE SULFATE 1 MG/ML
INJECTION SUBCUTANEOUS
Status: COMPLETED
Start: 2025-03-15 | End: 2025-03-15

## 2025-03-15 RX ORDER — SENNOSIDES 8.6 MG
1 TABLET ORAL DAILY
Status: DISCONTINUED | OUTPATIENT
Start: 2025-03-15 | End: 2025-03-17 | Stop reason: HOSPADM

## 2025-03-15 RX ORDER — CHLOROPROCAINE HYDROCHLORIDE 30 MG/ML
INJECTION, SOLUTION EPIDURAL; INFILTRATION; INTRACAUDAL; PERINEURAL AS NEEDED
Status: DISCONTINUED | OUTPATIENT
Start: 2025-03-15 | End: 2025-03-15

## 2025-03-15 RX ORDER — KETOROLAC TROMETHAMINE 30 MG/ML
INJECTION, SOLUTION INTRAMUSCULAR; INTRAVENOUS AS NEEDED
Status: DISCONTINUED | OUTPATIENT
Start: 2025-03-15 | End: 2025-03-15

## 2025-03-15 RX ORDER — OXYCODONE HYDROCHLORIDE 5 MG/1
5 TABLET ORAL EVERY 4 HOURS PRN
Refills: 0 | Status: DISCONTINUED | OUTPATIENT
Start: 2025-03-15 | End: 2025-03-15

## 2025-03-15 RX ORDER — CHLOROPROCAINE HYDROCHLORIDE 30 MG/ML
INJECTION, SOLUTION EPIDURAL; INFILTRATION; INTRACAUDAL; PERINEURAL
Status: COMPLETED
Start: 2025-03-15 | End: 2025-03-15

## 2025-03-15 RX ORDER — KETOROLAC TROMETHAMINE 30 MG/ML
30 INJECTION, SOLUTION INTRAMUSCULAR; INTRAVENOUS EVERY 6 HOURS SCHEDULED
Status: DISPENSED | OUTPATIENT
Start: 2025-03-15 | End: 2025-03-16

## 2025-03-15 RX ORDER — KETOROLAC TROMETHAMINE 30 MG/ML
INJECTION, SOLUTION INTRAMUSCULAR; INTRAVENOUS
Status: COMPLETED
Start: 2025-03-15 | End: 2025-03-15

## 2025-03-15 RX ORDER — LIDOCAINE HCL/EPINEPHRINE/PF 2%-1:200K
VIAL (ML) INJECTION AS NEEDED
Status: DISCONTINUED | OUTPATIENT
Start: 2025-03-15 | End: 2025-03-15

## 2025-03-15 RX ORDER — ACETAMINOPHEN 325 MG/1
650 TABLET ORAL EVERY 6 HOURS SCHEDULED
Status: DISCONTINUED | OUTPATIENT
Start: 2025-03-15 | End: 2025-03-17 | Stop reason: HOSPADM

## 2025-03-15 RX ORDER — FENTANYL CITRATE 50 UG/ML
INJECTION, SOLUTION INTRAMUSCULAR; INTRAVENOUS
Status: COMPLETED
Start: 2025-03-15 | End: 2025-03-15

## 2025-03-15 RX ORDER — DIPHENHYDRAMINE HYDROCHLORIDE 50 MG/ML
25 INJECTION, SOLUTION INTRAMUSCULAR; INTRAVENOUS EVERY 6 HOURS PRN
Status: DISCONTINUED | OUTPATIENT
Start: 2025-03-15 | End: 2025-03-17 | Stop reason: HOSPADM

## 2025-03-15 RX ADMIN — CHLOROPROCAINE HYDROCHLORIDE 5 ML: 30 INJECTION, SOLUTION EPIDURAL; INFILTRATION; INTRACAUDAL; PERINEURAL at 10:51

## 2025-03-15 RX ADMIN — EPHEDRINE SULFATE 10 MG: 50 INJECTION INTRAVENOUS at 10:58

## 2025-03-15 RX ADMIN — SODIUM CHLORIDE, SODIUM LACTATE, POTASSIUM CHLORIDE, AND CALCIUM CHLORIDE 999 ML/HR: .6; .31; .03; .02 INJECTION, SOLUTION INTRAVENOUS at 10:37

## 2025-03-15 RX ADMIN — Medication 62.5 MILLI-UNITS/MIN: at 12:46

## 2025-03-15 RX ADMIN — Medication 999 MILLI-UNITS/MIN: at 11:49

## 2025-03-15 RX ADMIN — TERBUTALINE SULFATE: 1 INJECTION, SOLUTION SUBCUTANEOUS at 11:13

## 2025-03-15 RX ADMIN — MORPHINE SULFATE 3 MG: 0.5 INJECTION, SOLUTION EPIDURAL; INTRATHECAL; INTRAVENOUS at 12:09

## 2025-03-15 RX ADMIN — LIDOCAINE HYDROCHLORIDE AND EPINEPHRINE 3 ML: 15; 5 INJECTION, SOLUTION EPIDURAL; INFILTRATION; INTRACAUDAL; PERINEURAL at 10:51

## 2025-03-15 RX ADMIN — CHLOROPROCAINE HYDROCHLORIDE 5 ML: 30 INJECTION, SOLUTION EPIDURAL; INFILTRATION; INTRACAUDAL; PERINEURAL at 10:55

## 2025-03-15 RX ADMIN — KETOROLAC TROMETHAMINE 30 MG: 30 INJECTION, SOLUTION INTRAMUSCULAR; INTRAVENOUS at 12:13

## 2025-03-15 RX ADMIN — SODIUM CHLORIDE, SODIUM LACTATE, POTASSIUM CHLORIDE, AND CALCIUM CHLORIDE 125 ML/HR: .6; .31; .03; .02 INJECTION, SOLUTION INTRAVENOUS at 17:29

## 2025-03-15 RX ADMIN — ONDANSETRON 4 MG: 2 INJECTION INTRAMUSCULAR; INTRAVENOUS at 11:38

## 2025-03-15 RX ADMIN — NOREPINEPHRINE BITARTRATE 8 MCG: 1 INJECTION, SOLUTION, CONCENTRATE INTRAVENOUS at 11:06

## 2025-03-15 RX ADMIN — NOREPINEPHRINE BITARTRATE 8 MCG: 1 INJECTION, SOLUTION, CONCENTRATE INTRAVENOUS at 10:59

## 2025-03-15 RX ADMIN — CEFAZOLIN SODIUM 2000 MG: 2 SOLUTION INTRAVENOUS at 11:32

## 2025-03-15 RX ADMIN — FENTANYL CITRATE 100 MCG: 50 INJECTION, SOLUTION INTRAMUSCULAR; INTRAVENOUS at 11:32

## 2025-03-15 RX ADMIN — LIDOCAINE HYDROCHLORIDE,EPINEPHRINE BITARTRATE 2 ML: 20; .005 INJECTION, SOLUTION EPIDURAL; INFILTRATION; INTRACAUDAL; PERINEURAL at 11:38

## 2025-03-15 RX ADMIN — SODIUM CHLORIDE, SODIUM LACTATE, POTASSIUM CHLORIDE, AND CALCIUM CHLORIDE 999 ML/HR: .6; .31; .03; .02 INJECTION, SOLUTION INTRAVENOUS at 09:30

## 2025-03-15 RX ADMIN — EPHEDRINE SULFATE 10 MG: 50 INJECTION INTRAVENOUS at 10:59

## 2025-03-15 RX ADMIN — NOREPINEPHRINE BITARTRATE 8 MCG: 1 INJECTION, SOLUTION, CONCENTRATE INTRAVENOUS at 11:00

## 2025-03-15 RX ADMIN — LIDOCAINE HYDROCHLORIDE,EPINEPHRINE BITARTRATE 5 ML: 20; .005 INJECTION, SOLUTION EPIDURAL; INFILTRATION; INTRACAUDAL; PERINEURAL at 11:36

## 2025-03-15 NOTE — ANESTHESIA PREPROCEDURE EVALUATION
Procedure:  EXTERNAL CEPHALIC PHYLLIS TRIAGE    Relevant Problems   GYN   (+) 36 weeks gestation of pregnancy   (+) Multigravida of advanced maternal age in third trimester      Behavioral Health   (+) Tobacco use disorder (Quit 2023)      Obstetrics/Gynecology   (+) Breech presentation (For ECV then planned induction)   (+) Marginal insertion of umbilical cord affecting management of mother   (+) Obesity affecting pregnancy        Physical Exam    Airway    Mallampati score: II  TM Distance: >3 FB       Dental   No notable dental hx     Cardiovascular  Rhythm: regular, Rate: normal    Pulmonary   Breath sounds clear to auscultation    Other Findings  post-pubertal.      Anesthesia Plan  ASA Score- 2     Anesthesia Type- epidural with ASA Monitors.         Additional Monitors:     Airway Plan:            Plan Factors-Exercise tolerance (METS): >4 METS.    Chart reviewed.   Existing labs reviewed.     Patient is not a current smoker.              Induction-     Postoperative Plan-         Informed Consent- Anesthetic plan and risks discussed with patient.        NPO Status:  No vitals data found for the desired time range.

## 2025-03-15 NOTE — OP NOTE
OPERATIVE REPORT  PATIENT NAME: Mariia Theodore    :  1980  MRN: [id number]  Pt Location: [facility] L[facility] OR ROOM 01    SURGERY DATE: 3/15/2025    Surgeons and Role:     * Luciana Culp MD - Primary     * Oly Bazan MD - Assisting    Procedure(s) (LRB):   SECTION () (N/A)    Specimen(s):  ID Type Source Tests Collected by Time Destination   1 : TO PATHOLOGY Tissue Placenta TISSUE EXAM Luciana Culp MD 3/15/2025 1139    A :  Cord Blood Cord BLOOD GAS, VENOUS, CORD Luciana Culp MD 3/15/2025 1139    B :  Cord Blood Cord BLOOD GAS, ARTERIAL, CORD Luciana Culp MD 3/15/2025 1139        Surgical QBL:  Surgical QBL (mL): 149 mL      Drains:  Urethral Catheter 16 Fr. (Active)   Number of days: 0        Section Procedure Note    Indications:   Non-reassuring FHT at term  Breech presentation    Pre-operative Diagnosis:   37w1d pregnancy  Breech presentation  AMA  Tobacco use  Marginal cord insertion   Elevated blood pressure without diagnosis    Post-operative Diagnosis:   S/p 1LTCS  AMA  Tobacco use  Elevated blood pressure without diagnosis    Hiram Group Classification System:   No Multiple pregnancy, No Transverse or oblique lie, Breech lie, Multiparous +  is HIRAM GROUP 7    Attending: Luciana Culp MD  Resident: Oly Bazan MD    Maternal Findings:  Normal uterus  Normal tubes and ovaries bilaterally  No adhesions  Bilateral rectus muscles hemostatic upon closure  No difficulty noted from skin to delivery     Findings:  Viable female weighing 6 lbs 0.3 oz;  Apgar scores of 9 at one minute and 9 at five minutes.   Clear amniotic fluid  Normal placenta with 3-vessel cord    Arterial and Venous Gases:  Umbilical Cord Venous Blood Gas:  Results from last 7 days   Lab Units 03/15/25  1139   PH COV  7.369   PCO2 COV mm HG 37.6   HCO3 COV mmol/L 21.2   BASE EXC COV mmol/L -3.5*   O2 CT CD VB mL/dL 8.9   O2 HGB, VENOUS CORD % 40.5     Umbilical  Cord Arterial Blood Gas:  Results from last 7 days   Lab Units 03/15/25  1139   PH COA  7.352   PCO2 COA  40.7   PO2 COA mm HG 16.4   HCO3 COA mmol/L 22.1   BASE EXC COA mmol/L -3.3*   O2 CONTENT CORD ART ml/dl 8.0   O2 HGB, ARTERIAL CORD % 36.1       Specimens: Arterial and venous cord gases, cord blood, segment of umbilical cord, placenta to pathology    Quantitative Blood Loss: Surgical QBL (mL): 149 mL    Fluids: LR    Drains: Suarez catheter           Complications:  None; patient tolerated the procedure well.           Disposition:            Condition: stable    Procedure Details   Mraiia Theodore is a 44 y.o.  who was admitted at 37w1d for ECV in setting of breech presentation. Her pregnancy was otherwise also complicated by AMA, tobacco use, marginal cord insertion, and obesity. She received an epidural in preparation for ECV, after which she felt like she was going to pass out and FHT were noted to drop to the 80s bpm. This was managed with maternal repositioning, IV medication to improve maternal BP, and terbutaline, with eventual improvement to baseline. However, due to prolonged deceleration and after long discussion regarding risks/benefits with the patient, decision was made to proceed to OR for 1LTCS in setting of non-reassuring FHT rather than proceed with ECV attempt.     The patient was taken to the OB Operating Room at 1131 where she received a rebolus of her epidural. For infection prophylaxis, she received 2 g ancef preoperatively. Fetal heart tones in the OR were assessed and noted to be within normal limits. A Suarez catheter and SCDs were already in place. The abdomen was prepped with Chloraprep, the vagina was prepped with Chlorhexidine, and following appropriate drying time, the patient was draped in the usual sterile manner. A time-out was held and the above information confirmed. The patient was identified as Mariia Theodore and the procedure verified as a   Delivery for non-reassuring fetal heart tracing at term.    A Pfannenstiel incision was made and carried down through the underlying subcutaneous tissue to the fascia using a scalpel. Rectus fascia was then incised. We then proceeded in Last-Rondon fashion. All anatomic layers were well-demarcated. The rectus muscles were  and the peritoneum was identified, entered, and extended longitudinally with blunt dissection. A low transverse uterine incision was made with the scalpel and extended cephalad to caudally with blunt dissection. The amnion was entered bluntly for clear fluid.    The fetal breech was palpated, elevated, and delivered through the uterine incision. Pinard maneuvers were utilized to delivery the fetal legs. Fetal body was delivered to the level of the scapula and arms were swept midline and delivered. Fetal vertex was flexed and delivered through the hysterotomy. There was noted to be spontaneous cry and good tone. There was no apparent injury to the . The umbilical cord was doubly clamped and cut after 30 seconds to allow for delayed cord clamping. The infant was handed off to the  providers. Arterial and venous cord gases, cord blood, and a segment of umbilical cord were obtained for evaluation. The placenta delivered spontaneously at 1151 with uterine fundal massage and appeared normal.     An Jose retractor was placed for improved visualization. The uterine incision was closed with a running locked suture of 0 Vicryl. A second layer of the same suture was used to imbricate the first. Hemostasis was noted to be excellent. The paracolic gutters were inspected and cleared of all clots and debris with moist lap sponges. The Jose retractor was removed. Bilateral rectus muscles were observed to be hemostatic during closure. The fascia was closed with a running suture of 0 Vicryl. The skin was closed with a subcuticular running suture of 4-0 Monocryl. Exofin was applied. The  uterine fundus was appreciated to be firm at the completion of closure. Minimal vaginal bleeding was appreciated upon fundal pressure.    The patient appeared to tolerate the procedure very well. Lap sponge, needle, and instrument counts were correct x2. The patient's fundus was palpated and the uterus was expressed. She was then cleaned and transferred to her postpartum recovery room in stable condition and her infant went to the  nursery.    Oly Bazan MD  OB/GYN PGY-2  3/15/2025 12:39 PM

## 2025-03-15 NOTE — OB LABOR/OXYTOCIN SAFETY PROGRESS
Labor Progress Note - Mariia Theodore 44 y.o. female MRN: [id number]    Unit/Bed#: -01 Encounter: [id number]       Contraction Frequency (minutes): 0  Contraction Intensity: Mild  Uterine Activity Characteristics: Irritability                 Baseline Rate (FHR): 135 bpm     FHR Category: 2               Vital Signs:   Vitals:    03/15/25 0924   BP: 138/91   Pulse: 89   Resp: 18   Temp: 97.6 °F (36.4 °C)       Notes/comments:   Epidural anesthesia administered by Dr Block in anticipation of ECV.  Immediately after, mild relative hypotension noted and deceleration noted. Repositioned side to side, then hands and knees. IVF and O2 applied. Ephedrine administerd by anesthesia. Terbutaline administered by OB. Recovery noted once in hands and knees.  11min down with recovery to baseline noted thereafter.     Discussed with pt while decelerations secondary to hypotension are not uncommon post-epidural, this was more severe than the expected. Discussed if recovery to category 1 tracing noted with persistence of same, can consider ECV. However with risks being placental injury and NRFHT, there is increased risk of recurrence and need for emergency . Patient agrees with concerns and no longer wants to proceed with ECV in light of increased risk. I recommend proceeding to delivery via  section. She is in agreement with this plan.     Anesthesia, L&D charge aware. Suarez catheter placed. OR being opened now with close fetal monitoring in setting of recovery. If remains appropriate in short term, proceed routine to allow for fetal reserve for surgery (STAT as tones necessitate).     Luciana Culp MD 3/15/2025 11:23 AM

## 2025-03-15 NOTE — ANESTHESIA POSTPROCEDURE EVALUATION
Post-Op Assessment Note    CV Status:  Stable  Pain Score: 0    Pain management: adequate       Mental Status:  Alert and awake   Hydration Status:  Stable and euvolemic   PONV Controlled:  None   Airway Patency:  Patent and adequate     Post Op Vitals Reviewed: Yes    No anethesia notable event occurred.    Staff: CRNA           Last Filed PACU Vitals:  Vitals Value Taken Time   Temp 97.4    Pulse 109    /78    Resp 18    SpO2 100

## 2025-03-15 NOTE — DISCHARGE INSTR - AVS FIRST PAGE
"Discharge instructions:   -Do not place anything (no partner, sex toys, tampons, douche, etc.) in your vagina for 6 weeks  -You may walk for exercise for the first 6 weeks then gradually return to your usual activities   -Please do not drive for 1 week if you have no stitches and for 2 weeks if you have stitches    -Please do not drive if you are taking any narcotic medications  -You may take baths or shower per your preference   -Please examine your breasts in the mirror daily and call your doctor for redness, tenderness, increased warmth, fevers, or chills  -Please call your doctor's office for temperature > 100.4*F or 38*C, worsening pain, increased bleeding (filling 2 or more maxi pads within 1 hr or less for at least 2 hrs), foul-smelling discharge/drainage, burning with urination, or symptoms of depression    For pain, you may take 650mg of Tylenol every 6 hours  For cramping, you may take 600mg of ibuprofen every 6 hours    You can alternate Tylenol and ibuprofen and take them \"around the clock\" to stay ahead of pain. For example, take 650mg of Tylenol at 9 AM, then 600mg of ibuprofen at 12 PM, then 650mg of Tylenol at 3 PM, and so forth.     Please take over the counter stool softener or laxative to ensure you do not strain when moving your bowels. You can take whatever is preferred (Miralax, Senna, Metamucil).    If you have any questions or concerns, please call your doctor.      "

## 2025-03-15 NOTE — ANESTHESIA PROCEDURE NOTES
Epidural Block    Patient location during procedure: OB/L&D  Start time: 3/15/2025 10:50 AM  Reason for block: primary anesthetic  Staffing  Performed by: Natty Block MD  Authorized by: Natty Block MD    Preanesthetic Checklist  Completed: patient identified, IV checked, site marked, risks and benefits discussed, surgical consent, monitors and equipment checked, pre-op evaluation and timeout performed  Epidural  Patient position: sitting  Prep: Betadine  Sedation Level: no sedation  Patient monitoring: heart rate, frequent blood pressure checks and continuous pulse oximetry  Approach: midline  Location: lumbar, L3-4  Injection technique: VALENTINE saline  Needle  Needle type: Tuohy   Needle gauge: 18 G  Needle insertion depth: 8 cm  Catheter type: multi-orifice  Catheter size: 20 G  Catheter at skin depth: 14 cm  Catheter securement method: clear occlusive dressing, stabilization device and tape  Test dose: negativelidocaine-epinephrine (XYLOCAINE-MPF/EPINEPHRINE) 1.5 %-1:200,000 injection 3 mL - Epidural   3 mL - 3/15/2025 10:51:00 AM  Assessment  Sensory level: T10  Number of attempts: 1no paresthesia on injection, negative aspiration for heme and negative aspiration for CSF  patient tolerated the procedure well with no immediate complications

## 2025-03-15 NOTE — DISCHARGE SUMMARY
Discharge Summary - OB/GYN   Name: Mariia Theodore 44 y.o. female I MRN: [id number]  Unit/Bed#: -01 I Date of Admission: 3/15/2025   Date of Service: 3/17/2025 I Hospital Day: 2    ADMISSION  Patient of: [facility]  Admission Date: 3/15/2025   Admitting Attending: Dr. Luciana Culp MD  Admitting Diagnoses:   Patient Active Problem List   Diagnosis    Tobacco use disorder    Closed fracture of one rib of left side    Closed fracture of nasal bone    Marginal insertion of umbilical cord affecting management of mother    37 weeks gestation of pregnancy    Multigravida of advanced maternal age in third trimester    Breech presentation    Obesity affecting pregnancy       DELIVERY  Delivery Method: , Low Transverse   Delivery Date and Time: 3/15/2025 11:48 AM  Delivery Attending: Luciana Culp    DISCHARGE  Discharge Date: 3/17/2025  Discharge Attending: Dr. Barahona  Discharge Diagnosis:   Same, Delivered  S/P  delivery  Gestational HTN  Acute blood loss anemia    Clinical course: Admission to Delivery  Mariia Theodore is a 44 y.o.  who was admitted at 37w1d for ECV in setting of breech presentation. Her pregnancy was otherwise also complicated by AMA, tobacco use, marginal cord insertion, and obesity. She received an epidural in preparation for ECV, after which she felt like she was going to pass out and FHT were noted to drop to the 80s bpm. This was managed with maternal repositioning, IV medication to improve maternal BP, and terbutaline, with eventual improvement to baseline. However, due to prolonged deceleration and after long discussion regarding risks/benefits with the patient, decision was made to proceed to OR for 1LTCS in setting of non-reassuring FHT rather than proceed with ECV attempt.     Delivery  Route of Delivery: , Low Transverse  Reason for  delivery: Category II FHR Tracing      Anesthesia: Epidural,   QBL: 149  cc    Delivery: , Low Transverse at 3/15/2025 11:48 AM    Baby's Weight: 2730 g (6 lb 0.3 oz); 96.3    Apgar scores: 9  and 9  at 1 and 5 minutes, respectively    Clinical Course: Post-Delivery:  The post delivery course was remarkable for mild elevated BP meeting criteria for GHTN. BP on POD#2 were 138 - 144/79-99. Patient was started on Procardia XL 30 mg and warning signs for severe pre-eclampsia were reviewed. Patient was asymptomatic.     On the day of discharge, the patient was ambulating, voiding spontaneously, tolerating oral intake, and hemodynamically stable. She was able to reasonably perform all ADLs. She had appropriate bowel function. Pain was well-controlled. She was discharged home on postpartum/postop day #2 without complications. Patient was instructed to follow up with her OB as an outpatient in 1 week for BP check and incision check and was given appropriate warnings to call her provider with problems or concerns. Postpartum visit in 3-4 weeks.    Pertinent lab findings included:   Blood type O-.     Last three Hgb values:  Lab Results   Component Value Date    HGB 9.7 (L) 2025    HGB 12.4 03/15/2025    HGB 11.7 2025        Problem-specific follow-up plans included the following:  See Progress Note from the day of discharge     Discharge med list:  Contraception: declines     Medication List      ASK your doctor about these medications     PRENATAL 1 + IRON PO       Condition at discharge:   good     Disposition:   Home    Planned Readmission:   No    Elizabeth Carmona MD  PGY-2 OB/GYN    Attending/Teaching Physician Statement  I have participated in the care of this patient during this hospitalization and agree with the discharge summary.    Sherry Barahona DO  25  11:11 AM

## 2025-03-15 NOTE — H&P
"H & P- Obstetrics   Mariia Theodore 44 y.o. female MRN: [id number]  Unit/Bed#: -01 Encounter: [id number]    Assessment: 44 y.o.  at 37w1d admitted for ECV.  SVE: deferred  FHT: reactive  Breech confirmed by TAUS  GBS status: positive     Plan:   Breech presentation  Assessment & Plan  Admitted for ECV, breech positioned confirmed on TAUS  Admission labs - CBC, T&S, syphilis screen, CMP, urine P:C  Diet: NPO  Analgesia: patient desires epidural, anesthesia team aware  Rh status: negative, FOB also Rh negative, Rhogam not indicated      36 weeks gestation of pregnancy  Assessment & Plan  PNL wnl   O negative  GBS positive    Marginal insertion of umbilical cord affecting management of mother  Assessment & Plan  To be aware at delivery      Discussed case and plan w/ Dr. Culp.    Chief Complaint: \"I'm here for my ECV.\"    HPI: Mariia Theodore is a 44 y.o.  with an ROBERT of 2025, by Ultrasound at 37w1d who is being admitted for scheduled ECV. She denies having uterine contractions, has no LOF, and reports no VB. She states she has felt good FM.    Patient Active Problem List   Diagnosis    Tobacco use disorder    Closed fracture of one rib of left side    Closed fracture of nasal bone    Marginal insertion of umbilical cord affecting management of mother    36 weeks gestation of pregnancy    Multigravida of advanced maternal age in third trimester    Breech presentation    Obesity affecting pregnancy       Baby complications/comments: breech presentation    Review of Systems   All other systems reviewed and are negative.      OB Hx:  OB History    Para Term  AB Living   6 4 4  1 4   SAB IAB Ectopic Multiple Live Births   0 1   4      # Outcome Date GA Lbr Matthieu/2nd Weight Sex Type Anes PTL Lv   6 Current            5 IAB  12w0d          4 Term 12 40w0d  3572 g (7 lb 14 oz) M Vag-Spont   KIERAN   3 Term 05/15/07 40w0d  3402 g (7 lb 8 oz) F Vag-Spont   KIERAN   2 Term 07/15/05 " 40w0d  3799 g (8 lb 6 oz) M Vag-Spont   KIERAN   1 Term 09/09/01 40w0d  4054 g (8 lb 15 oz) F Vag-Spont   KIERAN       Past Medical Hx:  Past Medical History:   Diagnosis Date    Anxiety     Varicella        Past Surgical hx:  Past Surgical History:   Procedure Laterality Date    CLOSURE WOUND      left arm     Social Hx:  Alcohol use: denies  Tobacco use: endorses  Other substance use: denies    No Known Allergies      Medications Prior to Admission:     Prenatal Multivit-Min-Fe-FA (PRENATAL 1 + IRON PO)    Objective:  Temp:  [97.6 °F (36.4 °C)] 97.6 °F (36.4 °C)  HR:  [89] 89  BP: (138)/(91) 138/91  Resp:  [18] 18  Body mass index is 31.78 kg/m².     Physical Exam:  Physical Exam  Constitutional:       Appearance: Normal appearance.   Genitourinary:      Vulva normal.   Cardiovascular:      Rate and Rhythm: Normal rate.   Pulmonary:      Effort: Pulmonary effort is normal.   Abdominal:      Palpations: Abdomen is soft.   Neurological:      General: No focal deficit present.      Mental Status: She is alert and oriented to person, place, and time.   Skin:     General: Skin is dry.   Psychiatric:         Mood and Affect: Mood normal.        FHT:  Baseline Rate (FHR): 135 bpm  Accelerations: 15 x 15 or greater  Decelerations: None    TOCO:   Contraction Frequency (minutes): 0    Lab Results   Component Value Date    WBC 11.38 (H) 03/15/2025    HGB 12.4 03/15/2025    HCT 37.8 03/15/2025     03/15/2025     Lab Results   Component Value Date     04/21/2015    K 3.9 09/24/2024     09/24/2024    CO2 23 09/24/2024    BUN 11 09/24/2024    CREATININE 0.67 09/24/2024    GLUCOSE 96 04/21/2015     Prenatal Labs: Reviewed      Blood type: O negative  Antibody: negative  GBS: positive  HIV: non-reactive  Rubella: immune  Syphilis IgM/IgG: non-reactive  HBsAg: non-reactive  HCAb: non-reactive  Chlamydia: negative  Gonorrhea: negative  Diabetes 1 hour screen: not completed  3 hour glucose: n/a  Platelets: pending  admission CBC  Hgb: pending admission CBC  >2 Midnights  INPATIENT     Signature/Title: Oly Bazan MD  Date: 3/15/2025  Time: 10:08 AM

## 2025-03-15 NOTE — ASSESSMENT & PLAN NOTE
1LTCS for nonreassuring fetal heart tracing after analgesia placement prior to ECV attempt   ; Hgb 12.4 --> AM CBC to be collected, pt declined early AM labs  S/p bob, passed VT  Pain: Tylenol and toradol scheduled, renetta 5/10 PRN    FEN: Tolerating regular diet  DVT ppx: SCDs, pt declined Lovenox for DVT prophylaxis, Strongly encouraged ambulation. Reviewed risk of blood clots after surgery. Pt expressed understanding  Passing flatus   Incision C/D/I   Anticipate d/c PPD#2

## 2025-03-16 LAB
ERYTHROCYTE [DISTWIDTH] IN BLOOD BY AUTOMATED COUNT: 13.9 % (ref 11.6–15.1)
HCT VFR BLD AUTO: 29.9 % (ref 34.8–46.1)
HGB BLD-MCNC: 9.7 G/DL (ref 11.5–15.4)
MCH RBC QN AUTO: 29.6 PG (ref 26.8–34.3)
MCHC RBC AUTO-ENTMCNC: 32.4 G/DL (ref 31.4–37.4)
MCV RBC AUTO: 91 FL (ref 82–98)
PLATELET # BLD AUTO: 160 THOUSANDS/UL (ref 149–390)
PMV BLD AUTO: 11.9 FL (ref 8.9–12.7)
RBC # BLD AUTO: 3.28 MILLION/UL (ref 3.81–5.12)
TREPONEMA PALLIDUM IGG+IGM AB [PRESENCE] IN SERUM OR PLASMA BY IMMUNOASSAY: NORMAL
WBC # BLD AUTO: 8.53 THOUSAND/UL (ref 4.31–10.16)

## 2025-03-16 PROCEDURE — 99024 POSTOP FOLLOW-UP VISIT: CPT | Performed by: OBSTETRICS & GYNECOLOGY

## 2025-03-16 PROCEDURE — 85027 COMPLETE CBC AUTOMATED: CPT

## 2025-03-16 RX ORDER — FERROUS SULFATE 325(65) MG
325 TABLET ORAL 2 TIMES DAILY WITH MEALS
Status: DISCONTINUED | OUTPATIENT
Start: 2025-03-16 | End: 2025-03-17 | Stop reason: HOSPADM

## 2025-03-16 RX ADMIN — KETOROLAC TROMETHAMINE 30 MG: 30 INJECTION, SOLUTION INTRAMUSCULAR; INTRAVENOUS at 20:23

## 2025-03-16 NOTE — ASSESSMENT & PLAN NOTE
1LTCS for nonreassuring fetal heart tracing after analgesia placement prior to ECV attempt   ; Hgb 12.4 --> 9.7, iron supplementation IV or oral recommended but declined   S/p bob, passed VT. Voiding spontaneously without issue   Pain: Tylenol and toradol scheduled, renetta 5/10 PRN    FEN: Tolerating regular diet  DVT ppx: SCDs, pt declined Lovenox for DVT prophylaxis, Strongly encouraged ambulation. Reviewed risk of blood clots after surgery. Pt expressed understanding  Passing flatus   Incision C/D/I   Anticipate d/c PPD#2

## 2025-03-16 NOTE — PROGRESS NOTES
Progress Note - OB/GYN   Name: Mariia Theodore 44 y.o. female I MRN: [id number]  Unit/Bed#: -01 I Date of Admission: 3/15/2025   Date of Service: 3/17/2025 I Hospital Day: 2     Assessment & Plan  S/P  section  1LTCS for nonreassuring fetal heart tracing after analgesia placement prior to ECV attempt   ; Hgb 12.4 --> 9.7, iron supplementation IV or oral recommended but declined   S/p bob, passed VT. Voiding spontaneously without issue   Pain: Tylenol and toradol scheduled, renetta 5/10 PRN    FEN: Tolerating regular diet  DVT ppx: SCDs, pt declined Lovenox for DVT prophylaxis, Strongly encouraged ambulation. Reviewed risk of blood clots after surgery. Pt expressed understanding  Passing flatus   Incision C/D/I   Anticipate d/c PPD#2    Gestational hypertension  Systolic (24hrs), Av , Min:136 , Max:144   Diastolic (24hrs), Av, Min:78, Max:99  Asymptomatic   Recommended Procardia XL 30 mg  Patient has a [employer] so she will not be in the home BP program  She has a BP cuff at home and is agreeable to taking BP twice a day and reporting any elevated  Reviewed s/s of severe preE      OB Post-Partum Progress Note  Subjective   Post delivery. Patient is doing well. Lochia WNL. Pain well controlled but she is having strong cramping. She is anxious to go home. She says she will sign out AMA if  not discharged. She believes being in the hospital is contributing to her elevated BP.     Pain: yes, cramping, improved with meds  Tolerating PO: yes  Voiding: yes  Flatus: yes  BM: no  Ambulating: yes  Breastfeeding:    Chest pain: no  Shortness of breath: no  Leg pain: no  Lochia: WNL    Objective :  Temp:  [97.7 °F (36.5 °C)-98.7 °F (37.1 °C)] 98.7 °F (37.1 °C)  HR:  [80-88] 88  BP: (136-144)/(78-99) 142/78  Resp:  [16-18] 16  SpO2:  [96 %-98 %] 97 %  O2 Device: None (Room air)      General: Well appearing, no distress  Respiratory: Unlabored breathing, no respiratory distress  Cardiovascular: Regular  rate.  Abdomen: Soft, gravid, nontender, incision c/d/I  Extremities: Warm and well perfused.  Non tender., SCDs not applied at time of exam      Lab Results: I have reviewed the following results:  Lab Results   Component Value Date    WBC 8.53 03/16/2025    HGB 9.7 (L) 03/16/2025    HCT 29.9 (L) 03/16/2025    MCV 91 03/16/2025     03/16/2025       Elizabeth Carmona MD  03/17/25  PGY-2 OB/GYN

## 2025-03-16 NOTE — PROGRESS NOTES
Progress Note - OB/GYN   Name: Mariia Theodore 44 y.o. female I MRN: [id number]  Unit/Bed#: -01 I Date of Admission: 3/15/2025   Date of Service: 3/16/2025 I Hospital Day: 1     Assessment & Plan  S/P  section  1LTCS for nonreassuring fetal heart tracing after analgesia placement prior to ECV attempt   ; Hgb 12.4 --> AM CBC to be collected, pt declined early AM labs  S/p bob, passed VT  Pain: Tylenol and toradol scheduled, renetta 5/10 PRN    FEN: Tolerating regular diet  DVT ppx: SCDs, pt declined Lovenox for DVT prophylaxis, Strongly encouraged ambulation. Reviewed risk of blood clots after surgery. Pt expressed understanding  Passing flatus   Incision C/D/I   Anticipate d/c PPD#2    Obesity affecting pregnancy  BMI 31.7    OB Post-Partum Progress Note  Subjective   Post delivery. Patient is doing well. Lochia WNL. Pain well controlled. Did not sleep     Pain: yes, cramping, improved with meds  Tolerating PO: yes  Voiding: yes  Flatus: yes  BM: no  Ambulating: yes  Breastfeeding:    Chest pain: no  Shortness of breath: no  Leg pain: no  Lochia: WNL    Objective :  Temp:  [97.3 °F (36.3 °C)-98.3 °F (36.8 °C)] 98.3 °F (36.8 °C)  HR:  [] 76  BP: (105-144)/(55-91) 122/76  Resp:  [16-22] 16  SpO2:  [95 %-100 %] 96 %  O2 Device: None (Room air)  Nasal Cannula O2 Flow Rate (L/min):  [6 L/min] 6 L/min  FiO2 (%):  [21] 21      General: Well appearing, no distress  Respiratory: Unlabored breathing, no respiratory distress  Cardiovascular: Regular rate.  Abdomen: Soft, gravid, nontender, incision c/d/I  Extremities: Warm and well perfused.  Non tender., SCDs not applied at time of exam      Lab Results: I have reviewed the following results:  Lab Results   Component Value Date    WBC 11.38 (H) 03/15/2025    HGB 12.4 03/15/2025    HCT 37.8 03/15/2025    MCV 87 03/15/2025     03/15/2025       David Hyatt,   25

## 2025-03-16 NOTE — PLAN OF CARE
Problem: PAIN - ADULT  Goal: Verbalizes/displays adequate comfort level or baseline comfort level  Description: Interventions:  - Encourage patient to monitor pain and request assistance  - Assess pain using appropriate pain scale  - Administer analgesics based on type and severity of pain and evaluate response  - Implement non-pharmacological measures as appropriate and evaluate response  - Consider cultural and social influences on pain and pain management  - Notify physician/advanced practitioner if interventions unsuccessful or patient reports new pain  Outcome: Progressing     Problem: INFECTION - ADULT  Goal: Absence or prevention of progression during hospitalization  Description: INTERVENTIONS:  - Assess and monitor for signs and symptoms of infection  - Monitor lab/diagnostic results  - Monitor all insertion sites, i.e. indwelling lines, tubes, and drains  - Monitor endotracheal if appropriate and nasal secretions for changes in amount and color  - Spray appropriate cooling/warming therapies per order  - Administer medications as ordered  - Instruct and encourage patient and family to use good hand hygiene technique  - Identify and instruct in appropriate isolation precautions for identified infection/condition  Outcome: Progressing     Problem: SAFETY ADULT  Goal: Patient will remain free of falls  Description: INTERVENTIONS:  - Educate patient/family on patient safety including physical limitations  - Instruct patient to call for assistance with activity   - Consult OT/PT to assist with strengthening/mobility   - Keep Call bell within reach  - Keep bed low and locked with side rails adjusted as appropriate  - Keep care items and personal belongings within reach  - Initiate and maintain comfort rounds  - Make Fall Risk Sign visible to staff  - Offer Toileting every  Hours, in advance of need  - Initiate/Maintainalarm  - Obtain necessary fall risk management equipment:   - Apply yellow socks and  bracelet for high fall risk patients  - Consider moving patient to room near nurses station  Outcome: Progressing  Goal: Maintain or return to baseline ADL function  Description: INTERVENTIONS:  -  Assess patient's ability to carry out ADLs; assess patient's baseline for ADL function and identify physical deficits which impact ability to perform ADLs (bathing, care of mouth/teeth, toileting, grooming, dressing, etc.)  - Assess/evaluate cause of self-care deficits   - Assess range of motion  - Assess patient's mobility; develop plan if impaired  - Assess patient's need for assistive devices and provide as appropriate  - Encourage maximum independence but intervene and supervise when necessary  - Involve family in performance of ADLs  - Assess for home care needs following discharge   - Consider OT consult to assist with ADL evaluation and planning for discharge  - Provide patient education as appropriate  Outcome: Progressing  Goal: Maintains/Returns to pre admission functional level  Description: INTERVENTIONS:  - Perform AM-PAC 6 Click Basic Mobility/ Daily Activity assessment daily.  - Set and communicate daily mobility goal to care team and patient/family/caregiver.   - Collaborate with rehabilitation services on mobility goals if consulted  - Perform Range of Motion  times a day.  - Reposition patient every hours.  - Dangle patient  times a day  - Stand patient  times a day  - Ambulate patient times a day  - Out of bed to chair  times a day   - Out of bed for meals times a day  - Out of bed for toileting  - Record patient progress and toleration of activity level   Outcome: Progressing     Problem: POSTPARTUM  Goal: Experiences normal postpartum course  Description: INTERVENTIONS:  - Monitor maternal vital signs  - Assess uterine involution and lochia  Outcome: Progressing  Goal: Appropriate maternal -  bonding  Description: INTERVENTIONS:  - Identify family support  - Assess for appropriate  maternal/infant bonding   -Encourage maternal/infant bonding opportunities  - Referral to  or  as needed  Outcome: Progressing  Goal: Establishment of infant feeding pattern  Description: INTERVENTIONS:  - Assess breast/bottle feeding  - Refer to lactation as needed  Outcome: Progressing  Goal: Incision(s), wounds(s) or drain site(s) healing without S/S of infection  Description: INTERVENTIONS  - Assess and document dressing, incision, wound bed, drain sites and surrounding tissue  - Provide patient and family education  - Perform skin care/dressing changes every   Outcome: Progressing

## 2025-03-17 VITALS
DIASTOLIC BLOOD PRESSURE: 80 MMHG | WEIGHT: 191 LBS | BODY MASS INDEX: 31.82 KG/M2 | RESPIRATION RATE: 16 BRPM | TEMPERATURE: 98 F | HEIGHT: 65 IN | HEART RATE: 84 BPM | SYSTOLIC BLOOD PRESSURE: 140 MMHG | OXYGEN SATURATION: 98 %

## 2025-03-17 PROBLEM — O13.9 GESTATIONAL HYPERTENSION: Status: ACTIVE | Noted: 2025-03-17

## 2025-03-17 PROCEDURE — 99024 POSTOP FOLLOW-UP VISIT: CPT | Performed by: OBSTETRICS & GYNECOLOGY

## 2025-03-17 PROCEDURE — NC001 PR NO CHARGE: Performed by: OBSTETRICS & GYNECOLOGY

## 2025-03-17 RX ORDER — NIFEDIPINE 30 MG
30 TABLET, EXTENDED RELEASE ORAL DAILY
Qty: 30 TABLET | Refills: 2 | Status: SHIPPED | OUTPATIENT
Start: 2025-03-18

## 2025-03-17 RX ORDER — IBUPROFEN 200 MG
600 TABLET ORAL EVERY 6 HOURS PRN
Start: 2025-03-17

## 2025-03-17 RX ORDER — FERROUS SULFATE 325(65) MG
325 TABLET ORAL 2 TIMES DAILY WITH MEALS
Start: 2025-03-17

## 2025-03-17 RX ORDER — ACETAMINOPHEN 325 MG/1
650 TABLET ORAL EVERY 6 HOURS SCHEDULED
Start: 2025-03-17 | End: 2025-03-20

## 2025-03-17 RX ORDER — NIFEDIPINE 30 MG/1
30 TABLET, EXTENDED RELEASE ORAL DAILY
Status: DISCONTINUED | OUTPATIENT
Start: 2025-03-17 | End: 2025-03-17 | Stop reason: HOSPADM

## 2025-03-17 RX ADMIN — FERROUS SULFATE TAB 325 MG (65 MG ELEMENTAL FE) 325 MG: 325 (65 FE) TAB at 08:42

## 2025-03-17 RX ADMIN — IBUPROFEN 600 MG: 600 TABLET, FILM COATED ORAL at 08:42

## 2025-03-17 RX ADMIN — NIFEDIPINE 30 MG: 30 TABLET, FILM COATED, EXTENDED RELEASE ORAL at 08:42

## 2025-03-17 NOTE — PLAN OF CARE
Problem: PAIN - ADULT  Goal: Verbalizes/displays adequate comfort level or baseline comfort level  Description: Interventions:  - Encourage patient to monitor pain and request assistance  - Assess pain using appropriate pain scale  - Administer analgesics based on type and severity of pain and evaluate response  - Implement non-pharmacological measures as appropriate and evaluate response  - Consider cultural and social influences on pain and pain management  - Notify physician/advanced practitioner if interventions unsuccessful or patient reports new pain  Outcome: Progressing     Problem: INFECTION - ADULT  Goal: Absence or prevention of progression during hospitalization  Description: INTERVENTIONS:  - Assess and monitor for signs and symptoms of infection  - Monitor lab/diagnostic results  - Monitor all insertion sites, i.e. indwelling lines, tubes, and drains  - Monitor endotracheal if appropriate and nasal secretions for changes in amount and color  - [place] appropriate cooling/warming therapies per order  - Administer medications as ordered  - Instruct and encourage patient and family to use good hand hygiene technique  - Identify and instruct in appropriate isolation precautions for identified infection/condition  Outcome: Progressing     Problem: SAFETY ADULT  Goal: Patient will remain free of falls  Description: INTERVENTIONS:  - Educate patient/family on patient safety including physical limitations  - Instruct patient to call for assistance with activity   - Consult OT/PT to assist with strengthening/mobility   - Keep Call bell within reach  - Keep bed low and locked with side rails adjusted as appropriate  - Keep care items and personal belongings within reach  - Initiate and maintain comfort rounds  - Make Fall Risk Sign visible to staff  - Offer Toileting every  Hours, in advance of need  - Initiate/Maintain alarm  - Obtain necessary fall risk management equipment:   - Apply yellow socks and  bracelet for high fall risk patients  - Consider moving patient to room near nurses station  Outcome: Progressing  Goal: Maintain or return to baseline ADL function  Description: INTERVENTIONS:  -  Assess patient's ability to carry out ADLs; assess patient's baseline for ADL function and identify physical deficits which impact ability to perform ADLs (bathing, care of mouth/teeth, toileting, grooming, dressing, etc.)  - Assess/evaluate cause of self-care deficits   - Assess range of motion  - Assess patient's mobility; develop plan if impaired  - Assess patient's need for assistive devices and provide as appropriate  - Encourage maximum independence but intervene and supervise when necessary  - Involve family in performance of ADLs  - Assess for home care needs following discharge   - Consider OT consult to assist with ADL evaluation and planning for discharge  - Provide patient education as appropriate  Outcome: Progressing  Goal: Maintains/Returns to pre admission functional level  Description: INTERVENTIONS:  - Perform AM-PAC 6 Click Basic Mobility/ Daily Activity assessment daily.  - Set and communicate daily mobility goal to care team and patient/family/caregiver.   - Collaborate with rehabilitation services on mobility goals if consulted  - Perform Range of Motion  times a day.  - Reposition patient every  hours.  - Dangle patient  times a day  - Stand patient  times a day  - Ambulate patient  times a day  - Out of bed to chair  times a day   - Out of bed for meals  times a day  - Out of bed for toileting  - Record patient progress and toleration of activity level   Outcome: Progressing     Problem: POSTPARTUM  Goal: Experiences normal postpartum course  Description: INTERVENTIONS:  - Monitor maternal vital signs  - Assess uterine involution and lochia  Outcome: Progressing  Goal: Appropriate maternal -  bonding  Description: INTERVENTIONS:  - Identify family support  - Assess for appropriate  maternal/infant bonding   -Encourage maternal/infant bonding opportunities  - Referral to  or  as needed  Outcome: Progressing  Goal: Establishment of infant feeding pattern  Description: INTERVENTIONS:  - Assess breast/bottle feeding  - Refer to lactation as needed  Outcome: Progressing  Goal: Incision(s), wounds(s) or drain site(s) healing without S/S of infection  Description: INTERVENTIONS  - Assess and document dressing, incision, wound bed, drain sites and surrounding tissue  - Provide patient and family education  - Perform skin care/dressing changes every   Outcome: Progressing

## 2025-03-17 NOTE — PLAN OF CARE
Problem: PAIN - ADULT  Goal: Verbalizes/displays adequate comfort level or baseline comfort level  Description: Interventions:  - Encourage patient to monitor pain and request assistance  - Assess pain using appropriate pain scale  - Administer analgesics based on type and severity of pain and evaluate response  - Implement non-pharmacological measures as appropriate and evaluate response  - Consider cultural and social influences on pain and pain management  - Notify physician/advanced practitioner if interventions unsuccessful or patient reports new pain  Outcome: Progressing     Problem: INFECTION - ADULT  Goal: Absence or prevention of progression during hospitalization  Description: INTERVENTIONS:  - Assess and monitor for signs and symptoms of infection  - Monitor lab/diagnostic results  - Monitor all insertion sites, i.e. indwelling lines, tubes, and drains  - Monitor endotracheal if appropriate and nasal secretions for changes in amount and color  - [place] appropriate cooling/warming therapies per order  - Administer medications as ordered  - Instruct and encourage patient and family to use good hand hygiene technique  - Identify and instruct in appropriate isolation precautions for identified infection/condition  Outcome: Progressing     Problem: SAFETY ADULT  Goal: Patient will remain free of falls  Description: INTERVENTIONS:  - Educate patient/family on patient safety including physical limitations  - Instruct patient to call for assistance with activity   - Consult OT/PT to assist with strengthening/mobility   - Keep Call bell within reach  - Keep bed low and locked with side rails adjusted as appropriate  - Keep care items and personal belongings within reach  - Initiate and maintain comfort rounds  - Make Fall Risk Sign visible to staff  - Apply yellow socks and bracelet for high fall risk patients  - Consider moving patient to room near nurses station  Outcome: Progressing  Goal: Maintain or  return to baseline ADL function  Description: INTERVENTIONS:  -  Assess patient's ability to carry out ADLs; assess patient's baseline for ADL function and identify physical deficits which impact ability to perform ADLs (bathing, care of mouth/teeth, toileting, grooming, dressing, etc.)  - Assess/evaluate cause of self-care deficits   - Assess range of motion  - Assess patient's mobility; develop plan if impaired  - Assess patient's need for assistive devices and provide as appropriate  - Encourage maximum independence but intervene and supervise when necessary  - Involve family in performance of ADLs  - Assess for home care needs following discharge   - Consider OT consult to assist with ADL evaluation and planning for discharge  - Provide patient education as appropriate  Outcome: Progressing  Goal: Maintains/Returns to pre admission functional level  Description: INTERVENTIONS:  - Perform AM-PAC 6 Click Basic Mobility/ Daily Activity assessment daily.  - Set and communicate daily mobility goal to care team and patient/family/caregiver.   - Collaborate with rehabilitation services on mobility goals if consulted  - Out of bed for toileting  - Record patient progress and toleration of activity level   Outcome: Progressing     Problem: POSTPARTUM  Goal: Experiences normal postpartum course  Description: INTERVENTIONS:  - Monitor maternal vital signs  - Assess uterine involution and lochia  Outcome: Progressing  Goal: Appropriate maternal -  bonding  Description: INTERVENTIONS:  - Identify family support  - Assess for appropriate maternal/infant bonding   -Encourage maternal/infant bonding opportunities  - Referral to  or  as needed  Outcome: Progressing  Goal: Establishment of infant feeding pattern  Description: INTERVENTIONS:  - Assess breast/bottle feeding  - Refer to lactation as needed  Outcome: Progressing  Goal: Incision(s), wounds(s) or drain site(s) healing without S/S of  infection  Description: INTERVENTIONS  - Assess and document dressing, incision, wound bed, drain sites and surrounding tissue  - Provide patient and family education  Outcome: Progressing

## 2025-03-17 NOTE — ASSESSMENT & PLAN NOTE
Systolic (24hrs), Av , Min:136 , Max:144   Diastolic (24hrs), Av, Min:78, Max:99  Asymptomatic   Recommended Procardia XL 30 mg  Patient has a [employer] so she will not be in the home BP program  She has a BP cuff at home and is agreeable to taking BP twice a day and reporting any elevated  Reviewed s/s of severe preE

## 2025-03-17 NOTE — NURSING NOTE
"Discharge teaching reviewed with patient and FOB. \"Save Your Life\" magnet given and reviewed with patient. Questions encouraged and all questions answered to their satisfaction.   "

## 2025-03-17 NOTE — UTILIZATION REVIEW
"Mother and baby discharged 2025       NOTIFICATION OF INPATIENT ADMISSION   MATERNITY/DELIVERY AUTHORIZATION REQUEST   SERVICING FACILITY:   [facility]  Parent Child Health - L&D, [place], NICU  [address] [place] [address]  Tax ID: [id number]  NPI: [id number] ATTENDING PROVIDER:  Attending Name and NPI#: Luciana Culp Md [[id number]]  Address: [address] [place] [address]  Phone: [phone]     ADMISSION INFORMATION:  Place of Service: [facility]patient [facility]  Place of Service Code: 21  Inpatient Admission Date/Time: 3/15/25 12:01 PM  Discharge Date/Time: 3/17/2025 11:40 AM  Admitting Diagnosis Code/Description:  No admission diagnoses are documented for this encounter.   Mother: Mariia Theodore 1980 Estimated Date of Delivery: 25  Delivering clinician: Luciana Culp   OB History          6    Para   5    Term   5            AB   1    Living   5         SAB   0    IAB   1    Ectopic        Multiple   0    Live Births   5                Name & MRN:   Information for the patient's :  Barbara Guan [[id number]]    Delivery Information:  Sex: female  Delivered 3/15/2025 11:48 AM by , Low Transverse; Gestational Age: 37w1d    [place] Measurements:  Weight: 6 lb 0.3 oz (2730 g);  Height: 18.5\"    APGAR 1 minute 5 minutes 10 minutes   Totals: 9 9       UTILIZATION REVIEW CONTACT:  Lillian Granados, Utilization   Network Utilization Review Department  Phone: [phone]  Fax [phone]  Email: [email or web address]  Contact for approvals/pending authorizations, clinical reviews, and discharge.   PHYSICIAN ADVISORY SERVICES:  Medical Necessity Denial & Ubtg-ds-Gcdv Review  Phone: [phone]  Fax: [phone]  Email: [email or web address]   DISCHARGE SUPPORT TEAM:  For Patients Discharge Needs & Updates  Phone: [phone] opt. 2 Fax: " [phone]  Email: [email or web address]

## 2025-03-19 PROCEDURE — 88307 TISSUE EXAM BY PATHOLOGIST: CPT | Performed by: PATHOLOGY

## 2025-03-24 ENCOUNTER — TELEPHONE (OUTPATIENT)
Dept: OBGYN CLINIC | Facility: CLINIC | Age: 45
End: 2025-03-24

## 2025-03-24 NOTE — TELEPHONE ENCOUNTER
Called patient for postpartum follow up call.  Left message on voice mail to return call.   Patient needs to schedule postpartum visit.

## 2025-08-01 ENCOUNTER — TELEPHONE (OUTPATIENT)
Dept: OBGYN CLINIC | Facility: MEDICAL CENTER | Age: 45
End: 2025-08-01

## (undated) DEVICE — SUT VICRYL 0 CT-1 36 IN J946H

## (undated) DEVICE — SCD SEQUENTIAL COMPRESSION COMFORT SLEEVE MEDIUM KNEE LENGTH: Brand: KENDALL SCD

## (undated) DEVICE — KIT,BETHLEHEM C SECT DELIVERY: Brand: CARDINAL HEALTH

## (undated) DEVICE — CHLORAPREP HI-LITE 26ML ORANGE

## (undated) DEVICE — GLOVE INDICATOR PI UNDERGLOVE SZ 7 BLUE

## (undated) DEVICE — ABG MICROSTICKS SAFETY

## (undated) DEVICE — SUT VICRYL 3-0 SH C/R 18 IN J864D

## (undated) DEVICE — SUT VICRYL 0 CTX 36 IN J978H

## (undated) DEVICE — SUT MONOCRYL 4-0 PS-2 27 IN Y426H

## (undated) DEVICE — SUT PLAIN 3-0 CT-1 27 IN 842H

## (undated) DEVICE — GLOVE SRG BIOGEL ECLIPSE 7